# Patient Record
Sex: MALE | Race: BLACK OR AFRICAN AMERICAN | Employment: OTHER | URBAN - METROPOLITAN AREA
[De-identification: names, ages, dates, MRNs, and addresses within clinical notes are randomized per-mention and may not be internally consistent; named-entity substitution may affect disease eponyms.]

---

## 2018-05-10 PROBLEM — N18.6 ESRD ON HEMODIALYSIS (HCC): Status: ACTIVE | Noted: 2017-11-20

## 2018-05-10 PROBLEM — R52 PAIN: Status: ACTIVE | Noted: 2017-10-27

## 2018-05-10 PROBLEM — S82.842A CLOSED BIMALLEOLAR FRACTURE OF LEFT ANKLE: Status: ACTIVE | Noted: 2017-12-26

## 2018-05-10 PROBLEM — R03.0 ELEVATED BLOOD PRESSURE READING: Status: ACTIVE | Noted: 2018-05-10

## 2018-05-10 PROBLEM — R09.02 HYPOXIA: Status: ACTIVE | Noted: 2017-05-13

## 2018-05-10 PROBLEM — S82.402A LEFT FIBULAR FRACTURE: Status: ACTIVE | Noted: 2017-11-20

## 2018-05-10 PROBLEM — E21.3 HYPERPARATHYROIDISM (HCC): Status: ACTIVE | Noted: 2018-04-17

## 2018-05-10 PROBLEM — R07.9 CHEST PAIN: Status: ACTIVE | Noted: 2018-05-10

## 2018-05-10 PROBLEM — N19 UREMIA: Status: ACTIVE | Noted: 2017-08-04

## 2018-05-10 PROBLEM — I21.4 NSTEMI (NON-ST ELEVATED MYOCARDIAL INFARCTION) (HCC): Status: ACTIVE | Noted: 2018-02-04

## 2018-05-10 PROBLEM — M79.672 LEFT FOOT PAIN: Status: ACTIVE | Noted: 2017-12-29

## 2018-05-10 PROBLEM — E87.5 HYPERKALEMIA: Status: ACTIVE | Noted: 2018-02-04

## 2018-05-10 PROBLEM — R76.0 LUPUS ANTICOAGULANT POSITIVE: Status: ACTIVE | Noted: 2018-05-10

## 2018-05-10 PROBLEM — M79.671 RIGHT FOOT PAIN: Status: ACTIVE | Noted: 2017-08-04

## 2018-05-10 PROBLEM — D64.9 ANEMIA: Status: ACTIVE | Noted: 2017-11-20

## 2018-05-10 PROBLEM — D72.829 LEUKOCYTOSIS: Status: ACTIVE | Noted: 2017-11-20

## 2018-05-10 PROBLEM — Z86.711 HX PULMONARY EMBOLISM: Status: ACTIVE | Noted: 2018-05-10

## 2018-05-10 PROBLEM — Z91.199 MEDICALLY NONCOMPLIANT: Status: ACTIVE | Noted: 2017-05-22

## 2018-05-10 PROBLEM — Z99.2 ESRD ON HEMODIALYSIS (HCC): Status: ACTIVE | Noted: 2017-11-20
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
## 2018-05-10 PROBLEM — E87.70 VOLUME OVERLOAD: Status: ACTIVE | Noted: 2017-08-16

## 2018-06-09 PROBLEM — R52 PAIN: Status: RESOLVED | Noted: 2017-10-27 | Resolved: 2018-06-09
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
## 2018-07-06 ENCOUNTER — INITIAL CONSULT (OUTPATIENT)
Dept: SURGERY | Age: 48
End: 2018-07-06

## 2018-07-06 VITALS
DIASTOLIC BLOOD PRESSURE: 106 MMHG | WEIGHT: 151 LBS | BODY MASS INDEX: 22.96 KG/M2 | HEART RATE: 67 BPM | SYSTOLIC BLOOD PRESSURE: 199 MMHG

## 2018-07-06 DIAGNOSIS — K40.90 REDUCIBLE LEFT INGUINAL HERNIA: Primary | ICD-10-CM

## 2018-07-06 PROCEDURE — 99203 OFFICE O/P NEW LOW 30 MIN: CPT | Performed by: SURGERY

## 2018-07-06 PROCEDURE — G8420 CALC BMI NORM PARAMETERS: HCPCS | Performed by: SURGERY

## 2018-07-06 PROCEDURE — G8427 DOCREV CUR MEDS BY ELIG CLIN: HCPCS | Performed by: SURGERY

## 2018-07-06 NOTE — PROGRESS NOTES
mouth daily      pantoprazole (PROTONIX) 40 MG tablet Take 40 mg by mouth daily      milnacipran HCl (SAVELLA) 12.5 MG TABS Take 12.5 mg by mouth 2 times daily      metoprolol succinate (TOPROL XL) 100 MG extended release tablet Take 100 mg by mouth 2 times daily      Cyanocobalamin (VITAMIN B 12 PO) Take 1,000 mcg by mouth daily       levetiracetam (KEPPRA) 500 MG tablet Take 500 mg by mouth 2 times daily       duloxetine (CYMBALTA) 60 MG capsule Take 60 mg by mouth daily.  Melatonin 3 MG TABS Take 3 mg by mouth nightly as needed       carbidopa-levodopa (SINEMET)  MG per tablet Take 3 tablets by mouth 3 times daily.  sevelamer (RENAGEL) 800 MG tablet Take 1,600 mg by mouth 3 times daily (with meals). Past Medical History:   Diagnosis Date    Arthritis     ESRD (end stage renal disease) on dialysis ([facility]     Mon, Wed, Fri [address] [place]    Fibromyalgia     HTN (hypertension)     Neuropathy ([facility]     Restless leg syndrome      Past Surgical History:   Procedure Laterality Date    HERNIA REPAIR       Family History   Problem Relation Age of Onset    Kidney Disease Mother     Hypertension Mother    [facility] Lupus Mother      Social History     Social History    Marital status: Single     Spouse name: N/A    Number of children: N/A    Years of education: N/A     Occupational History    Not on file.      Social History Main Topics    Smoking status: Current Every Day Smoker     Packs/day: 1.00     Types: Cigarettes    Smokeless tobacco: Never Used    Alcohol use 0.5 oz/week     1 Standard drinks or equivalent per week      Comment: drinks for pain relief    Drug use: Yes     Types: Marijuana    Sexual activity: Yes     Partners: Female     Other Topics Concern    Not on file     Social History Narrative    No narrative on file          Vitals:    07/06/18 0951 07/06/18 1001   BP: (!) 199/106 (!) 199/106   Pulse: 67    Weight: 151 lb (68.5 kg)      Body mass index is 22.96 kg/m². Wt Readings from Last 3 Encounters:   07/06/18 151 lb (68.5 kg)   05/11/18 140 lb 14 oz (63.9 kg)   03/13/15 177 lb 14.6 oz (80.7 kg)     BP Readings from Last 3 Encounters:   07/06/18 (!) 199/106   05/11/18 (!) 152/78   03/13/15 149/84          REVIEW OF SYSTEMS:   Pertinent positives are in HPI, otherwise all systems reviewed and negative    PHYSICAL EXAM:    CONSTITUTIONAL:  awake, alert, no apparent distress and normal weight  ENT:  normocephalic, without obvious abnormality  NECK:  supple, symmetrical, trachea midline   LUNGS:  Resp easy and unlabored  CARDIOVASCULAR:  regular rate and rhythm  ABDOMEN:  Infraumbilical and right groin incisions well healed, soft, non-distended, non-tender, voluntary guarding absent,  Examination for hernias: left inguinal hernia present, reducible; No testicular masses. MUSCULOSKELETAL: No edema  NEUROLOGIC:  Mental Status Exam:  Level of Alertness:   awake  Orientation:   person, place, time        ASSESSMENT:     Diagnosis Orders   1. Reducible left inguinal hernia           PLAN:    We will schedule the pt for open left inguinal hernia repair under MAC anesthesia. The technical aspects, risks, benefits and complications of the procedure were discussed with the patient. The pt appears to understand, asks appropriate questions, and agrees to proceed with the procedure. Will have him obtain a preop H&P with Dr. Rosangela Puente his PCP at [facility]. As he has dialysis T-Th-Sa, will plan for surgery this upcoming Wednesday. Will obtain labs on the day of surgery.       Electronically signed by Aleksey Dumont MD on 7/6/2018 at 10:20 AM

## 2018-07-06 NOTE — PATIENT INSTRUCTIONS
Surgeon: Carmelo Ceballos MD     Your surgery will be at [facility] [place]  First floor of the [address] [place] [address]. [address]    Date:  Arrival time:  Surgery time:       You will be on a 15 lbs weight restriction for 6 weeks after surgery. No driving until your off the pain medication. Nothing to eat or drink after midnight the night before. Your time and instructions will be given to you by the surgery scheduler, Dre Mackenzie. She will call you as well as send you a letter with all the details regarding your surgery. Please make a History and Physical (surgery clearance) by your primary care physician prior to your surgery date. * within 30 days of surgery*   Please contact Yenni if you need to reschedule your surgery or have any questions. Our office phone number is [phone].

## 2018-07-06 NOTE — LETTER
Surgery Scheduling Form:      DEMOGRAPHICS:                                                                                                         .    Patient Name:  Gabe Mckay  Patient :  1970   Patient SS#:      Patient Phone:  [phone] (home)  Alt. Patient Phone:                     Patient Address:  [address] [facility] [address]    PCP:  No primary care provider on file. Insurance:  Payor: MEDICARE / Plan: MEDICARE PART A AND B / Product Type: *No Product type* /   Insurance ID Number:    Payor/Plan Subscr  Sex Relation Sub. Ins. ID Effective Group Num   1. MEDICARE - ME* Suzy Fletcher 1970 Male  [id number] 1/1/15                                    [address]        DIAGNOSIS & PROCEDURE:                                                                                       .    Diagnosis:   K40.90 - Reducible left inguinal hernia      Operation:  Open Left Inguinal Hernia Repair with Mesh   Location: [facility]   Surgeon:  Fanny Ruiz MD          [facility] INFORMATION:                                                                                    .    Surgeon's Scheduling Instruction:  elective  Requested Date: 18   OR Time:           Patient Arrival Time:   OR Time Required:  90  Minutes  Anesthesia:  MAC/TIVA  Equipment:                                                            SA Required:  Yes     Status:  Outpatient          Standard C-Arm:  No   PAT Required:   Yes                             Best Time to Call:  ANY   Patient Requested to see PCP for Pre-op H & P:  Yes   Special Comments:                            Fanny Ruiz MD  55   PRE-CERTIFICATION INFORMATION:                                                                           .    Procedure:       CPT Code Modifier         68587

## 2018-07-10 ENCOUNTER — TELEPHONE (OUTPATIENT)
Dept: SURGERY | Age: 48
End: 2018-07-10

## 2018-07-11 ENCOUNTER — HOSPITAL ENCOUNTER (OUTPATIENT)
Dept: SURGERY | Age: 48
Discharge: HOME | End: 2018-07-11
Attending: SURGERY | Admitting: SURGERY

## 2018-07-11 VITALS
HEART RATE: 69 BPM | BODY MASS INDEX: 22.34 KG/M2 | SYSTOLIC BLOOD PRESSURE: 189 MMHG | TEMPERATURE: 97 F | WEIGHT: 147.38 LBS | DIASTOLIC BLOOD PRESSURE: 100 MMHG | HEIGHT: 68 IN | RESPIRATION RATE: 16 BRPM | OXYGEN SATURATION: 97 %

## 2018-07-11 DIAGNOSIS — K40.90 REDUCIBLE LEFT INGUINAL HERNIA: Primary | ICD-10-CM

## 2018-07-11 LAB
ANION GAP SERPL CALCULATED.3IONS-SCNC: 15 MMOL/L (ref 3–16)
BUN BLDV-MCNC: 30 MG/DL (ref 7–20)
CALCIUM SERPL-MCNC: 8.1 MG/DL (ref 8.3–10.6)
CHLORIDE BLD-SCNC: 101 MMOL/L (ref 99–110)
CO2: 27 MMOL/L (ref 21–32)
CREAT SERPL-MCNC: 9.3 MG/DL (ref 0.9–1.3)
GFR AFRICAN AMERICAN: 7
GFR NON-AFRICAN AMERICAN: 6
GLUCOSE BLD-MCNC: 84 MG/DL (ref 70–99)
POTASSIUM REFLEX MAGNESIUM: 4.9 MMOL/L (ref 3.5–5.1)
SODIUM BLD-SCNC: 143 MMOL/L (ref 136–145)

## 2018-07-11 PROCEDURE — 49505 PRP I/HERN INIT REDUC >5 YR: CPT | Performed by: SURGERY

## 2018-07-11 RX ORDER — SODIUM CHLORIDE 0.9 % (FLUSH) 0.9 %
10 SYRINGE (ML) INJECTION EVERY 12 HOURS SCHEDULED
Status: DISCONTINUED | OUTPATIENT
Start: 2018-07-11 | End: 2018-07-11 | Stop reason: SDUPTHER

## 2018-07-11 RX ORDER — FENTANYL CITRATE 50 UG/ML
25 INJECTION, SOLUTION INTRAMUSCULAR; INTRAVENOUS EVERY 5 MIN PRN
Status: DISCONTINUED | OUTPATIENT
Start: 2018-07-11 | End: 2018-07-12 | Stop reason: HOSPADM

## 2018-07-11 RX ORDER — LABETALOL HYDROCHLORIDE 5 MG/ML
INJECTION, SOLUTION INTRAVENOUS
Status: COMPLETED
Start: 2018-07-11 | End: 2018-07-11

## 2018-07-11 RX ORDER — LABETALOL HYDROCHLORIDE 5 MG/ML
10 INJECTION, SOLUTION INTRAVENOUS ONCE
Status: COMPLETED | OUTPATIENT
Start: 2018-07-11 | End: 2018-07-11

## 2018-07-11 RX ORDER — LEVETIRACETAM 500 MG/1
500 TABLET ORAL ONCE
Status: COMPLETED | OUTPATIENT
Start: 2018-07-11 | End: 2018-07-11

## 2018-07-11 RX ORDER — SODIUM CHLORIDE 0.9 % (FLUSH) 0.9 %
10 SYRINGE (ML) INJECTION PRN
Status: DISCONTINUED | OUTPATIENT
Start: 2018-07-11 | End: 2018-07-12 | Stop reason: HOSPADM

## 2018-07-11 RX ORDER — OXYCODONE HYDROCHLORIDE AND ACETAMINOPHEN 5; 325 MG/1; MG/1
1-2 TABLET ORAL EVERY 6 HOURS PRN
Qty: 28 TABLET | Refills: 0 | Status: SHIPPED | OUTPATIENT
Start: 2018-07-11 | End: 2018-07-18

## 2018-07-11 RX ORDER — OXYCODONE HYDROCHLORIDE AND ACETAMINOPHEN 5; 325 MG/1; MG/1
1 TABLET ORAL
Status: COMPLETED | OUTPATIENT
Start: 2018-07-11 | End: 2018-07-11

## 2018-07-11 RX ORDER — SODIUM CHLORIDE 9 MG/ML
INJECTION, SOLUTION INTRAVENOUS CONTINUOUS
Status: DISCONTINUED | OUTPATIENT
Start: 2018-07-11 | End: 2018-07-12 | Stop reason: HOSPADM

## 2018-07-11 RX ORDER — FENTANYL CITRATE 50 UG/ML
50 INJECTION, SOLUTION INTRAMUSCULAR; INTRAVENOUS EVERY 5 MIN PRN
Status: DISCONTINUED | OUTPATIENT
Start: 2018-07-11 | End: 2018-07-12 | Stop reason: HOSPADM

## 2018-07-11 RX ORDER — SODIUM CHLORIDE 0.9 % (FLUSH) 0.9 %
10 SYRINGE (ML) INJECTION EVERY 12 HOURS SCHEDULED
Status: DISCONTINUED | OUTPATIENT
Start: 2018-07-11 | End: 2018-07-12 | Stop reason: HOSPADM

## 2018-07-11 RX ORDER — LOSARTAN POTASSIUM 50 MG/1
100 TABLET ORAL ONCE
Status: COMPLETED | OUTPATIENT
Start: 2018-07-11 | End: 2018-07-11

## 2018-07-11 RX ORDER — SODIUM CHLORIDE 0.9 % (FLUSH) 0.9 %
10 SYRINGE (ML) INJECTION PRN
Status: DISCONTINUED | OUTPATIENT
Start: 2018-07-11 | End: 2018-07-11 | Stop reason: SDUPTHER

## 2018-07-11 RX ORDER — NIFEDIPINE 90 MG/1
90 TABLET, EXTENDED RELEASE ORAL ONCE
Status: COMPLETED | OUTPATIENT
Start: 2018-07-11 | End: 2018-07-11

## 2018-07-11 RX ORDER — HYDRALAZINE HYDROCHLORIDE 20 MG/ML
INJECTION INTRAMUSCULAR; INTRAVENOUS
Status: DISPENSED
Start: 2018-07-11 | End: 2018-07-11

## 2018-07-11 RX ORDER — HYDRALAZINE HYDROCHLORIDE 20 MG/ML
10 INJECTION INTRAMUSCULAR; INTRAVENOUS ONCE
Status: COMPLETED | OUTPATIENT
Start: 2018-07-11 | End: 2018-07-11

## 2018-07-11 RX ORDER — MEPERIDINE HYDROCHLORIDE 25 MG/ML
12.5 INJECTION INTRAMUSCULAR; INTRAVENOUS; SUBCUTANEOUS EVERY 5 MIN PRN
Status: DISCONTINUED | OUTPATIENT
Start: 2018-07-11 | End: 2018-07-12 | Stop reason: HOSPADM

## 2018-07-11 RX ORDER — DOCUSATE SODIUM 100 MG/1
100 CAPSULE, LIQUID FILLED ORAL 2 TIMES DAILY PRN
Status: ON HOLD | COMMUNITY
Start: 2018-07-11 | End: 2019-12-27 | Stop reason: ALTCHOICE

## 2018-07-11 RX ADMIN — LABETALOL HYDROCHLORIDE 10 MG: 5 INJECTION, SOLUTION INTRAVENOUS at 10:06

## 2018-07-11 RX ADMIN — Medication 50 MG: at 08:09

## 2018-07-11 RX ADMIN — LABETALOL HYDROCHLORIDE 10 MG: 5 INJECTION, SOLUTION INTRAVENOUS at 10:51

## 2018-07-11 RX ADMIN — HYDRALAZINE HYDROCHLORIDE 10 MG: 20 INJECTION INTRAMUSCULAR; INTRAVENOUS at 11:38

## 2018-07-11 RX ADMIN — HYDRALAZINE HYDROCHLORIDE 10 MG: 20 INJECTION INTRAMUSCULAR; INTRAVENOUS at 10:51

## 2018-07-11 RX ADMIN — SODIUM CHLORIDE: 9 INJECTION, SOLUTION INTRAVENOUS at 08:07

## 2018-07-11 RX ADMIN — LEVETIRACETAM 500 MG: 500 TABLET ORAL at 08:09

## 2018-07-11 RX ADMIN — OXYCODONE HYDROCHLORIDE AND ACETAMINOPHEN 1 TABLET: 5; 325 TABLET ORAL at 12:48

## 2018-07-11 RX ADMIN — LOSARTAN POTASSIUM 100 MG: 50 TABLET ORAL at 08:10

## 2018-07-11 RX ADMIN — LABETALOL HYDROCHLORIDE 10 MG: 5 INJECTION, SOLUTION INTRAVENOUS at 11:38

## 2018-07-11 RX ADMIN — NIFEDIPINE 90 MG: 90 TABLET, EXTENDED RELEASE ORAL at 08:09

## 2018-07-11 ASSESSMENT — PAIN SCALES - GENERAL
PAINLEVEL_OUTOF10: 0
PAINLEVEL_OUTOF10: 10
PAINLEVEL_OUTOF10: 0
PAINLEVEL_OUTOF10: 0

## 2018-07-11 ASSESSMENT — PAIN - FUNCTIONAL ASSESSMENT: PAIN_FUNCTIONAL_ASSESSMENT: 0-10

## 2018-07-11 ASSESSMENT — LIFESTYLE VARIABLES: SMOKING_STATUS: 1

## 2018-07-11 ASSESSMENT — ENCOUNTER SYMPTOMS: SHORTNESS OF BREATH: 1

## 2018-07-11 NOTE — ANESTHESIA PRE-OP
[facility] Department of Anesthesiology  Pre-Anesthesia Evaluation/Consultation       Name:  Meredith Ahumada  : 1970  Age:  50 y.o. MRN:  [id number]  Date: 2018           Procedure (Scheduled):  Open left inguinal hernia repair with mesh  Surgeon:  Dr. Armani Coats     Allergies   Allergen Reactions    Amlodipine Besylate-Valsartan Anaphylaxis    Losartan Swelling     Throat swelling  angioedema    Zolpidem Anaphylaxis     Other reaction(s):  Other (See Comments)  Triggers seizures    Diovan [Valsartan] Swelling     Other reaction(s): Unknown  Patient does not remember, possible angioedema    Lisinopril Rash     Other reaction(s): Unknown  Patient does not remember, possible angioedema  Angioedema    Lyrica [Pregabalin] Other (See Comments)     Other reaction(s): Unknown  Patient does not remember    Morphine Itching    Naproxen Swelling     Other reaction(s): Unknown  Patient does not remember    Ondansetron Hcl Itching    Povidone-Iodine Rash     itching     Patient Active Problem List   Diagnosis    HTN (hypertension), malignant    ESRD (end stage renal disease) on dialysis ([address]    Seizure disorder (HCC)    Restless legs syndrome (RLS)    Chronic pancreatitis (HCC)    Anemia    Anemia of chronic disease    Antiphospholipid antibody syndrome (HCC)    Atypical chest pain    C. difficile diarrhea    CH[facility]congestive heart failure) ([facility]    Chronic pain disorder    Closed bimalleolar fracture of left ankle    Elevated blood pressure reading    ESRD on hemodialysis ([address]    End-stage renal disease on hemodialysis ([address]    Essential hypertension    Lupus anticoagulant positive    Hx pulmonary embolism    History of pulmonary embolism    HTN (hypertension)    Hyperkalemia    Hyperparathyroidism ([address]    Hypoxia    Insomnia    Left fibular fracture    Left foot pain    Leukocytosis    Medically noncompliant    NSTEMI (non-ST tablet Take 40 mg by mouth daily      metoprolol succinate (TOPROL XL) 100 MG extended release tablet Take 100 mg by mouth 2 times daily      Cyanocobalamin (VITAMIN B 12 PO) Take 1,000 mcg by mouth daily       levetiracetam (KEPPRA) 500 MG tablet Take 500 mg by mouth 2 times daily       duloxetine (CYMBALTA) 60 MG capsule Take 60 mg by mouth daily.  Melatonin 3 MG TABS Take 3 mg by mouth nightly as needed       carbidopa-levodopa (SINEMET)  MG per tablet Take 3 tablets by mouth 3 times daily. No current facility-administered medications on file prior to encounter. Current Outpatient Prescriptions   Medication Sig Dispense Refill    albuterol sulfate  (90 Base) MCG/ACT inhaler Inhale 2 puffs into the lungs      promethazine (PHENERGAN) 25 MG tablet Take 25 mg by mouth      promethazine (PHENERGAN) 25 MG tablet Take 25 mg by mouth      aspirin 81 MG chewable tablet Take 1 tablet by mouth daily 30 tablet 0    calcium carbonate (TUMS) 500 MG chewable tablet Take 3 tablets by mouth 3 times daily       losartan (COZAAR) 100 MG tablet Take 100 mg by mouth daily      NIFEdipine (PROCARDIA XL) 60 MG extended release tablet Take 60 mg by mouth daily      calcitRIOL (ROCALTROL) 0.5 MCG capsule Take 0.5 mcg by mouth 2 times daily      gabapentin (NEURONTIN) 100 MG capsule Take 100 mg by mouth 3 times daily. Orval Opitz isosorbide mononitrate (IMDUR) 30 MG extended release tablet Take 30 mg by mouth daily      atorvastatin (LIPITOR) 40 MG tablet Take 40 mg by mouth daily      pantoprazole (PROTONIX) 40 MG tablet Take 40 mg by mouth daily      metoprolol succinate (TOPROL XL) 100 MG extended release tablet Take 100 mg by mouth 2 times daily      Cyanocobalamin (VITAMIN B 12 PO) Take 1,000 mcg by mouth daily       levetiracetam (KEPPRA) 500 MG tablet Take 500 mg by mouth 2 times daily       duloxetine (CYMBALTA) 60 MG capsule Take 60 mg by mouth daily.         Melatonin 3 MG TABS

## 2018-07-11 NOTE — PROGRESS NOTES
Call placed to Dr. Flavia Kirby for new orders pain not relieved. Advised to call Dr. Jordyn López. Pt family member walked into the farrell and asked for someone to remove his IV so he could leave. This nurse went into the room to explain I was trying to get ahold of the Dr. Shea Jaeger demanded I take the IV out so he could leave. This nurse d/c'd IV. No complications noted. Pt began to walk out of the room refusing to use a wheelchair. This nurse insisted pt use a wheelchair. Assisted to car by volunteer Theodora Bryan.

## 2018-07-11 NOTE — PROGRESS NOTES
Patient arrived in pre op for left inguinal hernia repair; drowsy, poor historian. States he has been out of BP and seizure  meds for about one month; Dr. Bárbara Oakley and Dr. Nabor Amezcua made aware. Medications ordered and given. Patient states he takes Losartan, despite it being listed as an allergy. States tolerates Losartan fine. PCP does list as allergy, but also listed as current medication. Dr. Nabor Amezcua aware of BMP results.

## 2018-07-12 ENCOUNTER — TELEPHONE (OUTPATIENT)
Dept: SURGERY | Age: 48
End: 2018-07-12

## 2019-12-26 ENCOUNTER — HOSPITAL ENCOUNTER (INPATIENT)
Age: 49
LOS: 2 days | Discharge: HOME | DRG: 640 | End: 2019-12-28
Attending: EMERGENCY MEDICINE | Admitting: PEDIATRICS
Payer: MEDICARE

## 2019-12-26 ENCOUNTER — APPOINTMENT (OUTPATIENT)
Dept: CT IMAGING | Age: 49
DRG: 640 | End: 2019-12-26
Payer: MEDICARE

## 2019-12-26 ENCOUNTER — APPOINTMENT (OUTPATIENT)
Dept: GENERAL RADIOLOGY | Age: 49
DRG: 640 | End: 2019-12-26
Payer: MEDICARE

## 2019-12-26 LAB
A/G RATIO: 1.3 (ref 1.1–2.2)
ALBUMIN SERPL-MCNC: 4.5 G/DL (ref 3.4–5)
ALP BLD-CCNC: 184 U/L (ref 40–129)
ALT SERPL-CCNC: <5 U/L (ref 10–40)
ANION GAP SERPL CALCULATED.3IONS-SCNC: 28 MMOL/L (ref 3–16)
AST SERPL-CCNC: 18 U/L (ref 15–37)
BASOPHILS ABSOLUTE: 0.1 K/UL (ref 0–0.2)
BASOPHILS RELATIVE PERCENT: 1.2 %
BILIRUB SERPL-MCNC: 0.3 MG/DL (ref 0–1)
BUN BLDV-MCNC: 104 MG/DL (ref 7–20)
CALCIUM SERPL-MCNC: 9.4 MG/DL (ref 8.3–10.6)
CHLORIDE BLD-SCNC: 97 MMOL/L (ref 99–110)
CO2: 16 MMOL/L (ref 21–32)
CREAT SERPL-MCNC: 29.1 MG/DL (ref 0.9–1.3)
EOSINOPHILS ABSOLUTE: 0.2 K/UL (ref 0–0.6)
EOSINOPHILS RELATIVE PERCENT: 2.1 %
GFR AFRICAN AMERICAN: 2
GFR NON-AFRICAN AMERICAN: 2
GLOBULIN: 3.6 G/DL
GLUCOSE BLD-MCNC: 110 MG/DL (ref 70–99)
HCT VFR BLD CALC: 32.6 % (ref 40.5–52.5)
HEMOGLOBIN: 10.7 G/DL (ref 13.5–17.5)
LACTIC ACID: 1.1 MMOL/L (ref 0.4–2)
LIPASE: 201 U/L (ref 13–60)
LYMPHOCYTES ABSOLUTE: 1.1 K/UL (ref 1–5.1)
LYMPHOCYTES RELATIVE PERCENT: 12.1 %
MAGNESIUM: 2.7 MG/DL (ref 1.8–2.4)
MCH RBC QN AUTO: 32.1 PG (ref 26–34)
MCHC RBC AUTO-ENTMCNC: 32.9 G/DL (ref 31–36)
MCV RBC AUTO: 97.7 FL (ref 80–100)
MONOCYTES ABSOLUTE: 0.5 K/UL (ref 0–1.3)
MONOCYTES RELATIVE PERCENT: 5.3 %
NEUTROPHILS ABSOLUTE: 7.2 K/UL (ref 1.7–7.7)
NEUTROPHILS RELATIVE PERCENT: 79.3 %
PDW BLD-RTO: 17.8 % (ref 12.4–15.4)
PHOSPHORUS: 3 MG/DL (ref 2.5–4.9)
PLATELET # BLD: 157 K/UL (ref 135–450)
PMV BLD AUTO: 10.1 FL (ref 5–10.5)
POTASSIUM SERPL-SCNC: 6.1 MMOL/L (ref 3.5–5.1)
PRO-BNP: 8203 PG/ML (ref 0–124)
RBC # BLD: 3.34 M/UL (ref 4.2–5.9)
SODIUM BLD-SCNC: 141 MMOL/L (ref 136–145)
TOTAL PROTEIN: 8.1 G/DL (ref 6.4–8.2)
TROPONIN: 0.06 NG/ML
WBC # BLD: 9 K/UL (ref 4–11)

## 2019-12-26 PROCEDURE — 96374 THER/PROPH/DIAG INJ IV PUSH: CPT

## 2019-12-26 PROCEDURE — 80053 COMPREHEN METABOLIC PANEL: CPT

## 2019-12-26 PROCEDURE — 84484 ASSAY OF TROPONIN QUANT: CPT

## 2019-12-26 PROCEDURE — 85025 COMPLETE CBC W/AUTO DIFF WBC: CPT

## 2019-12-26 PROCEDURE — 1200000000 HC SEMI PRIVATE

## 2019-12-26 PROCEDURE — 6370000000 HC RX 637 (ALT 250 FOR IP): Performed by: PHYSICIAN ASSISTANT

## 2019-12-26 PROCEDURE — 83880 ASSAY OF NATRIURETIC PEPTIDE: CPT

## 2019-12-26 PROCEDURE — 93005 ELECTROCARDIOGRAM TRACING: CPT | Performed by: PHYSICIAN ASSISTANT

## 2019-12-26 PROCEDURE — 83735 ASSAY OF MAGNESIUM: CPT

## 2019-12-26 PROCEDURE — 99285 EMERGENCY DEPT VISIT HI MDM: CPT

## 2019-12-26 PROCEDURE — 71046 X-RAY EXAM CHEST 2 VIEWS: CPT

## 2019-12-26 PROCEDURE — 83605 ASSAY OF LACTIC ACID: CPT

## 2019-12-26 PROCEDURE — 2500000003 HC RX 250 WO HCPCS: Performed by: PHYSICIAN ASSISTANT

## 2019-12-26 PROCEDURE — 36415 COLL VENOUS BLD VENIPUNCTURE: CPT

## 2019-12-26 PROCEDURE — 74176 CT ABD & PELVIS W/O CONTRAST: CPT

## 2019-12-26 PROCEDURE — 84100 ASSAY OF PHOSPHORUS: CPT

## 2019-12-26 PROCEDURE — 83690 ASSAY OF LIPASE: CPT

## 2019-12-26 RX ORDER — DEXTROSE MONOHYDRATE 25 G/50ML
12.5 INJECTION, SOLUTION INTRAVENOUS PRN
Status: CANCELLED | OUTPATIENT
Start: 2019-12-26

## 2019-12-26 RX ORDER — NICOTINE POLACRILEX 4 MG
15 LOZENGE BUCCAL PRN
Status: CANCELLED | OUTPATIENT
Start: 2019-12-26

## 2019-12-26 RX ORDER — SODIUM POLYSTYRENE SULFONATE 15 G/60ML
10 SUSPENSION ORAL; RECTAL ONCE
Status: COMPLETED | OUTPATIENT
Start: 2019-12-26 | End: 2019-12-26

## 2019-12-26 RX ORDER — DEXTROSE MONOHYDRATE 25 G/50ML
25 INJECTION, SOLUTION INTRAVENOUS ONCE
Status: CANCELLED | OUTPATIENT
Start: 2019-12-26 | End: 2019-12-26

## 2019-12-26 RX ORDER — LABETALOL 300 MG/1
300 TABLET, FILM COATED ORAL 2 TIMES DAILY
Status: ON HOLD | COMMUNITY
End: 2019-12-27 | Stop reason: ALTCHOICE

## 2019-12-26 RX ADMIN — SODIUM BICARBONATE 50 MEQ: 84 INJECTION, SOLUTION INTRAVENOUS at 21:31

## 2019-12-26 RX ADMIN — Medication 10 G: at 21:31

## 2019-12-26 ASSESSMENT — PAIN DESCRIPTION - DESCRIPTORS: DESCRIPTORS: ACHING

## 2019-12-26 ASSESSMENT — ENCOUNTER SYMPTOMS
APNEA: 0
NAUSEA: 0
DIARRHEA: 1
BACK PAIN: 0
VOMITING: 0
SHORTNESS OF BREATH: 1
CHOKING: 0
EYE DISCHARGE: 0
ABDOMINAL PAIN: 0
EYE REDNESS: 0
FACIAL SWELLING: 0

## 2019-12-26 ASSESSMENT — PAIN SCALES - WONG BAKER: WONGBAKER_NUMERICALRESPONSE: 0

## 2019-12-26 ASSESSMENT — PAIN DESCRIPTION - LOCATION: LOCATION: BACK

## 2019-12-26 ASSESSMENT — PAIN DESCRIPTION - ONSET: ONSET: PROGRESSIVE

## 2019-12-26 ASSESSMENT — PAIN SCALES - GENERAL: PAINLEVEL_OUTOF10: 4

## 2019-12-26 ASSESSMENT — PAIN DESCRIPTION - PROGRESSION: CLINICAL_PROGRESSION: GRADUALLY WORSENING

## 2019-12-26 NOTE — ED PROVIDER NOTES
Positive for diarrhea. Negative for abdominal pain, nausea and vomiting. Genitourinary: Negative for dysuria. Musculoskeletal: Negative for back pain, neck pain and neck stiffness. Neurological: Negative for dizziness, tremors, seizures and headaches. All other systems reviewed and are negative. Positives and Pertinent negatives as per HPI. Except as noted above in the ROS, all other systems were reviewed and negative. PAST MEDICAL HISTORY     Past Medical History:   Diagnosis Date    Arthritis     ESRD (end stage renal disease) on [facility] ([facility]     Mon, Wed, Fri [address] Deborah Roberson     Hemodialysis patient ([facility]     HTN (hypertension)     Neuropathy     Restless leg     Restless leg syndrome     Seizure ([facility]     last sz 2016         SURGICAL HISTORY     Past Surgical History:   Procedure Laterality Date    AXILLARY-BRACHIAL BYPASS GRAFT Left     HERNIA REPAIR      PARATHYROIDECTOMY           CURRENTMEDICATIONS       Current Discharge Medication List      CONTINUE these medications which have NOT CHANGED    Details   labetalol (NORMODYNE) 300 MG tablet Take 300 mg by mouth 2 times daily      Multiple Vitamin (DAILY EZEKIEL PO) Take by mouth      albuterol sulfate  (90 Base) MCG/ACT inhaler Inhale 2 puffs into the lungs      ! ! promethazine (PHENERGAN) 25 MG tablet Take 25 mg by mouth      aspirin 81 MG chewable tablet Take 1 tablet by mouth daily  Qty: 30 tablet, Refills: 0      losartan (COZAAR) 100 MG tablet Take 100 mg by mouth daily      gabapentin (NEURONTIN) 100 MG capsule Take 300 mg by mouth 3 times daily.        atorvastatin (LIPITOR) 40 MG tablet Take 80 mg by mouth daily       metoprolol succinate (TOPROL XL) 100 MG extended release tablet Take 25 mg by mouth daily       Cyanocobalamin (VITAMIN B 12 PO) Take 1,000 mcg by mouth daily       levetiracetam (KEPPRA) 500 MG tablet Take 500 mg by mouth daily       duloxetine (CYMBALTA) 60 MG Narrative:     Performed at:  [facility]  [address] [place], [place]   Phone [phone]   COMPREHENSIVE METABOLIC PANEL - Abnormal; Notable for the following components:    Potassium 6.1 (*)     Chloride 97 (*)     CO2 16 (*)     Anion Gap 28 (*)     Glucose 110 (*)      (*)     CREATININE 29.1 (*)     GFR Non- 2 (*)     GFR African American 2 (*)     Alkaline Phosphatase 184 (*)     ALT <5 (*)     All other components within normal limits    Narrative:     GHASSAN Rowan  Lm Specter [id number],  Chemistry results called to and read back by jose payne, 12/26/2019  19:12, by [facility]  Performed at:  [facility]  [address] [place], [place]   Phone [phone]   LIPASE - Abnormal; Notable for the following components:    Lipase 201.0 (*)     All other components within normal limits    Narrative:     Jocelyne Amato Specter [id number],  Chemistry results called to and read back by jose payne, 12/26/2019  19:12, by [facility]  Performed at:  [facility]  [address] [place], [place]   Phone [phone] - Abnormal; Notable for the following components:    Pro-BNP 8,203 (*)     All other components within normal limits    Narrative:     Jocelyne Blaser [id number],  Chemistry results called to and read back by jose payne, 12/26/2019  19:12, by [facility]  Performed at:  [facility]) [facility]  [address] [place], [place]   Phone [phone]   MAGNESIUM - Abnormal; Notable for the following components:    Magnesium 2.70 (*)     All other components within normal limits    Narrative:     Jocelyne JEFFERSON tel. [phone],  Chemistry results called to and read back by jose payne, 12/26/2019  19:12, by [facility]  Performed at:  [facility]  [address] [place], [place]   Phone [phone]   TROPONIN - Abnormal; Notable for the following components:    Troponin 0.06 (*)     All other components within normal limits    Narrative:     Jennette Fabry [facility] [phone],  Chemistry results called to and read back by jose payne, 12/26/2019  19:12, by [facility]  Performed at:  [facility]) [facility]  [address] [place], [place]   Phone [phone]   LACTIC ACID, PLASMA    Narrative:     Performed at:  [facility]  [address] [place], [place]   Phone [phone]   PHOSPHORUS    Narrative:     [facility] tel. [phone],  Chemistry results called to and read back by jose Leon, 12/26/2019  19:12, by [facility]  Performed at:  [facility]  [address] [place], [place]   Phone [phone]   URINE RT REFLEX TO CULTURE       All other labs were within normal range or not returned as of this dictation. EKG: All EKG's are interpreted by the Emergency Department Physician in the absence of a cardiologist.  Please see their note for interpretation of EKG. RADIOLOGY:   Non-plain film images such as CT, Ultrasound and MRI are read by the radiologist. Plain radiographic images are visualized and preliminarily interpreted by the  ED Provider with the below findings:        Interpretation per the Radiologist below, if available at the time of this note:    CT ABDOMEN PELVIS WO CONTRAST Additional Contrast? None   Final Result   1. Nonspecific diarrheal disease   2. Polycystic kidney disease with nonobstructing nephrolithiasis   3. Colonic diverticulosis         XR CHEST STANDARD (2 VW)   Final Result   Interstitial pulmonary edema.   There is a possible sub pulmonic right pleural   effusion although elevation of the right diaphragm is favored No results found. PROCEDURES   Unless otherwise noted below, none     Procedures    CRITICAL CARE TIME   N/A    CONSULTS:  IP CONSULT TO NEPHROLOGY  IP CONSULT TO NEPHROLOGY  IP CONSULT TO HOSPITALIST      EMERGENCY DEPARTMENT COURSE and DIFFERENTIAL DIAGNOSIS/MDM:   Vitals:    Vitals:    12/26/19 2315 12/26/19 2330 12/27/19 0000 12/27/19 0103   BP: (!) 161/90 (!) 146/105 (!) 182/99 (!) 196/97   Pulse: 111 109 95 85   Resp: 19 25 19 16   Temp: 98.4 °F (36.9 °C)   98.3 °F (36.8 °C)   TempSrc: Oral   Oral   SpO2: 100%   100%   Weight:       Height:           Patient was given thefollowing medications:  Medications   sodium polystyrene (KAYEXALATE) 15 GM/60ML suspension 10 g (10 g Oral Given 12/26/19 2131)   sodium bicarbonate 8.4 % injection 50 mEq (50 mEq Intravenous Given 12/26/19 2131)       Emergency room course: Patient on exam pupils equal round and reactive to light extraocular movements intact. Throat is clear nonerythematous no exudate. Neck is supple full range of motion without tenderness. No midline tenderness or with thoracic or lumbar spine. Cardiovascular regular rate rhythm, lungs show faint expiratory wheeze no rales or rhonchi noted. Chest wall show no tenderness. Abdomen is soft somewhat distended. Nontender with palpation. Normal bowel sounds all 4 quadrant. Bilateral lower extremity shows 2+ pitting edema. Patient full range of motion lower extremity. Neurologically no other motor or sensory deficit noted. He is alert and oriented x4. Does not appear to be in acute distress. Besides blood pressure being elevated he has normal respiration and pulse rate with O2 sat at 97% on room air. Lab results from today shows CBC with a white count of 9.0, RBC 3.34, hemoglobin 10.7 hematocrit 32.6. CMP shows sodium of 141, potassium 6.1, chloride 97 with a  and creatinine 29.1. CO2 of 16. Normal transaminases. Lipase 201.0    Lactic acid 1.1    BNP 8203.     Magnesium note were completed with a voice recognition program.  Efforts were made to edit the dictations but occasionally words are mis-transcribed.)    Stella Garcia PA-C (electronically signed)           Stella Garcia PA-C  12/27/19 0111

## 2019-12-26 NOTE — ED NOTES
Pt was at [facility] ER this am had labs drawn  Demetrice Bars for pain medication for chronic back pain  Pt states has had episodes of diarrhea  Past 3 days no vomiting  C/o increase SOB today with swelling to abd     Melania Rodriguez RN  12/26/19 8913

## 2019-12-27 ENCOUNTER — APPOINTMENT (OUTPATIENT)
Dept: GENERAL RADIOLOGY | Age: 49
DRG: 640 | End: 2019-12-27
Payer: MEDICARE

## 2019-12-27 LAB
ALBUMIN SERPL-MCNC: 3.7 G/DL (ref 3.4–5)
ANION GAP SERPL CALCULATED.3IONS-SCNC: 35 MMOL/L (ref 3–16)
BUN BLDV-MCNC: 96 MG/DL (ref 7–20)
C-REACTIVE PROTEIN: 7.4 MG/L (ref 0–5.1)
CALCIUM SERPL-MCNC: 8.5 MG/DL (ref 8.3–10.6)
CHLORIDE BLD-SCNC: 95 MMOL/L (ref 99–110)
CO2: 8 MMOL/L (ref 21–32)
CREAT SERPL-MCNC: 30.8 MG/DL (ref 0.9–1.3)
EKG ATRIAL RATE: 80 BPM
EKG DIAGNOSIS: NORMAL
EKG P AXIS: 56 DEGREES
EKG P-R INTERVAL: 194 MS
EKG Q-T INTERVAL: 390 MS
EKG QRS DURATION: 104 MS
EKG QTC CALCULATION (BAZETT): 520 MS
EKG R AXIS: 50 DEGREES
EKG T AXIS: 26 DEGREES
EKG VENTRICULAR RATE: 107 BPM
GFR AFRICAN AMERICAN: 2
GFR NON-AFRICAN AMERICAN: 2
GLUCOSE BLD-MCNC: 103 MG/DL (ref 70–99)
HBV SURFACE AB TITR SER: 127.9 MIU/ML
HEPATITIS B SURFACE ANTIGEN INTERPRETATION: NORMAL
PHOSPHORUS: 3.7 MG/DL (ref 2.5–4.9)
POTASSIUM SERPL-SCNC: 6.7 MMOL/L (ref 3.5–5.1)
RAPID INFLUENZA  B AGN: NEGATIVE
RAPID INFLUENZA A AGN: NEGATIVE
SEDIMENTATION RATE, ERYTHROCYTE: 55 MM/HR (ref 0–15)
SODIUM BLD-SCNC: 138 MMOL/L (ref 136–145)
URIC ACID, SERUM: 11 MG/DL (ref 3.5–7.2)

## 2019-12-27 PROCEDURE — 1200000000 HC SEMI PRIVATE

## 2019-12-27 PROCEDURE — 2580000003 HC RX 258: Performed by: PEDIATRICS

## 2019-12-27 PROCEDURE — 86140 C-REACTIVE PROTEIN: CPT

## 2019-12-27 PROCEDURE — 6370000000 HC RX 637 (ALT 250 FOR IP): Performed by: PEDIATRICS

## 2019-12-27 PROCEDURE — 6370000000 HC RX 637 (ALT 250 FOR IP): Performed by: NURSE PRACTITIONER

## 2019-12-27 PROCEDURE — 90935 HEMODIALYSIS ONE EVALUATION: CPT

## 2019-12-27 PROCEDURE — 86706 HEP B SURFACE ANTIBODY: CPT

## 2019-12-27 PROCEDURE — 94760 N-INVAS EAR/PLS OXIMETRY 1: CPT

## 2019-12-27 PROCEDURE — 36415 COLL VENOUS BLD VENIPUNCTURE: CPT

## 2019-12-27 PROCEDURE — 80069 RENAL FUNCTION PANEL: CPT

## 2019-12-27 PROCEDURE — 87340 HEPATITIS B SURFACE AG IA: CPT

## 2019-12-27 PROCEDURE — 73560 X-RAY EXAM OF KNEE 1 OR 2: CPT

## 2019-12-27 PROCEDURE — 93010 ELECTROCARDIOGRAM REPORT: CPT | Performed by: INTERNAL MEDICINE

## 2019-12-27 PROCEDURE — 86704 HEP B CORE ANTIBODY TOTAL: CPT

## 2019-12-27 PROCEDURE — 5A1D70Z PERFORMANCE OF URINARY FILTRATION, INTERMITTENT, LESS THAN 6 HOURS PER DAY: ICD-10-PCS | Performed by: INTERNAL MEDICINE

## 2019-12-27 PROCEDURE — 73630 X-RAY EXAM OF FOOT: CPT

## 2019-12-27 PROCEDURE — 85652 RBC SED RATE AUTOMATED: CPT

## 2019-12-27 PROCEDURE — 84550 ASSAY OF BLOOD/URIC ACID: CPT

## 2019-12-27 PROCEDURE — 87804 INFLUENZA ASSAY W/OPTIC: CPT

## 2019-12-27 RX ORDER — PANTOPRAZOLE SODIUM 40 MG/1
40 TABLET, DELAYED RELEASE ORAL DAILY
Status: DISCONTINUED | OUTPATIENT
Start: 2019-12-27 | End: 2019-12-28 | Stop reason: HOSPADM

## 2019-12-27 RX ORDER — DULOXETIN HYDROCHLORIDE 60 MG/1
60 CAPSULE, DELAYED RELEASE ORAL DAILY
Status: DISCONTINUED | OUTPATIENT
Start: 2019-12-27 | End: 2019-12-28 | Stop reason: HOSPADM

## 2019-12-27 RX ORDER — DOCUSATE SODIUM 100 MG/1
100 CAPSULE, LIQUID FILLED ORAL 2 TIMES DAILY PRN
Status: DISCONTINUED | OUTPATIENT
Start: 2019-12-27 | End: 2019-12-28

## 2019-12-27 RX ORDER — ACETAMINOPHEN 325 MG/1
650 TABLET ORAL EVERY 6 HOURS PRN
Status: DISCONTINUED | OUTPATIENT
Start: 2019-12-27 | End: 2019-12-28 | Stop reason: HOSPADM

## 2019-12-27 RX ORDER — SODIUM CHLORIDE 0.9 % (FLUSH) 0.9 %
10 SYRINGE (ML) INJECTION EVERY 12 HOURS SCHEDULED
Status: DISCONTINUED | OUTPATIENT
Start: 2019-12-27 | End: 2019-12-28 | Stop reason: HOSPADM

## 2019-12-27 RX ORDER — LIDOCAINE 4 G/G
1 PATCH TOPICAL DAILY
Status: DISCONTINUED | OUTPATIENT
Start: 2019-12-27 | End: 2019-12-28 | Stop reason: HOSPADM

## 2019-12-27 RX ORDER — CALCIUM CARBONATE 200(500)MG
500 TABLET,CHEWABLE ORAL 3 TIMES DAILY
Status: DISCONTINUED | OUTPATIENT
Start: 2019-12-27 | End: 2019-12-28 | Stop reason: HOSPADM

## 2019-12-27 RX ORDER — SODIUM BICARBONATE 650 MG/1
650 TABLET ORAL 3 TIMES DAILY
Status: DISCONTINUED | OUTPATIENT
Start: 2019-12-27 | End: 2019-12-28

## 2019-12-27 RX ORDER — LEVETIRACETAM 500 MG/1
500 TABLET ORAL DAILY
Status: DISCONTINUED | OUTPATIENT
Start: 2019-12-27 | End: 2019-12-27 | Stop reason: DRUGHIGH

## 2019-12-27 RX ORDER — METOPROLOL SUCCINATE 25 MG/1
25 TABLET, EXTENDED RELEASE ORAL DAILY
Status: DISCONTINUED | OUTPATIENT
Start: 2019-12-27 | End: 2019-12-27

## 2019-12-27 RX ORDER — CALCITRIOL 0.25 UG/1
0.5 CAPSULE, LIQUID FILLED ORAL 2 TIMES DAILY
Status: DISCONTINUED | OUTPATIENT
Start: 2019-12-27 | End: 2019-12-28 | Stop reason: HOSPADM

## 2019-12-27 RX ORDER — TRAMADOL HYDROCHLORIDE 50 MG/1
50 TABLET ORAL EVERY 6 HOURS PRN
Status: DISCONTINUED | OUTPATIENT
Start: 2019-12-27 | End: 2019-12-28 | Stop reason: HOSPADM

## 2019-12-27 RX ORDER — COLCHICINE 0.6 MG/1
0.6 TABLET ORAL ONCE
Status: COMPLETED | OUTPATIENT
Start: 2019-12-27 | End: 2019-12-27

## 2019-12-27 RX ORDER — LIDOCAINE 4 G/G
1 PATCH TOPICAL DAILY
Status: DISCONTINUED | OUTPATIENT
Start: 2019-12-27 | End: 2019-12-27

## 2019-12-27 RX ORDER — GABAPENTIN 300 MG/1
300 CAPSULE ORAL 3 TIMES DAILY
Status: DISCONTINUED | OUTPATIENT
Start: 2019-12-27 | End: 2019-12-28 | Stop reason: HOSPADM

## 2019-12-27 RX ORDER — PREDNISONE 20 MG/1
20 TABLET ORAL ONCE
Status: COMPLETED | OUTPATIENT
Start: 2019-12-27 | End: 2019-12-27

## 2019-12-27 RX ORDER — ALBUTEROL SULFATE 90 UG/1
2 AEROSOL, METERED RESPIRATORY (INHALATION) EVERY 4 HOURS PRN
Status: DISCONTINUED | OUTPATIENT
Start: 2019-12-27 | End: 2019-12-28 | Stop reason: HOSPADM

## 2019-12-27 RX ORDER — LEVETIRACETAM 500 MG/1
500 TABLET ORAL 2 TIMES DAILY
Status: DISCONTINUED | OUTPATIENT
Start: 2019-12-27 | End: 2019-12-28 | Stop reason: HOSPADM

## 2019-12-27 RX ORDER — LANOLIN ALCOHOL/MO/W.PET/CERES
3 CREAM (GRAM) TOPICAL NIGHTLY PRN
Status: DISCONTINUED | OUTPATIENT
Start: 2019-12-27 | End: 2019-12-28 | Stop reason: HOSPADM

## 2019-12-27 RX ORDER — SODIUM CHLORIDE 0.9 % (FLUSH) 0.9 %
10 SYRINGE (ML) INJECTION PRN
Status: DISCONTINUED | OUTPATIENT
Start: 2019-12-27 | End: 2019-12-28 | Stop reason: HOSPADM

## 2019-12-27 RX ORDER — CALCIUM CARBONATE 200(500)MG
3 TABLET,CHEWABLE ORAL 3 TIMES DAILY
Status: DISCONTINUED | OUTPATIENT
Start: 2019-12-27 | End: 2019-12-27

## 2019-12-27 RX ORDER — ASPIRIN 81 MG/1
81 TABLET, CHEWABLE ORAL DAILY
Status: DISCONTINUED | OUTPATIENT
Start: 2019-12-27 | End: 2019-12-28 | Stop reason: HOSPADM

## 2019-12-27 RX ORDER — ATORVASTATIN CALCIUM 80 MG/1
80 TABLET, FILM COATED ORAL DAILY
Status: DISCONTINUED | OUTPATIENT
Start: 2019-12-27 | End: 2019-12-28 | Stop reason: HOSPADM

## 2019-12-27 RX ORDER — ISOSORBIDE MONONITRATE 30 MG/1
30 TABLET, EXTENDED RELEASE ORAL DAILY
Status: DISCONTINUED | OUTPATIENT
Start: 2019-12-27 | End: 2019-12-27

## 2019-12-27 RX ORDER — CLOPIDOGREL BISULFATE 75 MG/1
75 TABLET ORAL DAILY
COMMUNITY
Start: 2019-12-01

## 2019-12-27 RX ORDER — ISOSORBIDE MONONITRATE 30 MG/1
30 TABLET, EXTENDED RELEASE ORAL NIGHTLY
Status: DISCONTINUED | OUTPATIENT
Start: 2019-12-28 | End: 2019-12-28 | Stop reason: HOSPADM

## 2019-12-27 RX ORDER — NICOTINE 21 MG/24HR
1 PATCH, TRANSDERMAL 24 HOURS TRANSDERMAL DAILY
Status: DISCONTINUED | OUTPATIENT
Start: 2019-12-27 | End: 2019-12-28 | Stop reason: HOSPADM

## 2019-12-27 RX ORDER — GABAPENTIN 300 MG/1
600 CAPSULE ORAL 3 TIMES DAILY
COMMUNITY
Start: 2019-12-25

## 2019-12-27 RX ORDER — MULTIVITAMIN WITH FOLIC ACID 400 MCG
1 TABLET ORAL DAILY
Status: DISCONTINUED | OUTPATIENT
Start: 2019-12-27 | End: 2019-12-28 | Stop reason: HOSPADM

## 2019-12-27 RX ORDER — NICOTINE 21 MG/24HR
1 PATCH, TRANSDERMAL 24 HOURS TRANSDERMAL DAILY
Status: DISCONTINUED | OUTPATIENT
Start: 2019-12-27 | End: 2019-12-27

## 2019-12-27 RX ORDER — NIFEDIPINE 60 MG/1
60 TABLET, EXTENDED RELEASE ORAL DAILY
Status: DISCONTINUED | OUTPATIENT
Start: 2019-12-27 | End: 2019-12-27

## 2019-12-27 RX ADMIN — CARBIDOPA AND LEVODOPA 3 TABLET: 25; 100 TABLET ORAL at 22:05

## 2019-12-27 RX ADMIN — CARBIDOPA AND LEVODOPA 3 TABLET: 25; 100 TABLET ORAL at 07:59

## 2019-12-27 RX ADMIN — DULOXETINE HYDROCHLORIDE 60 MG: 60 CAPSULE, DELAYED RELEASE ORAL at 16:19

## 2019-12-27 RX ADMIN — CALCITRIOL 0.5 MCG: 0.25 CAPSULE ORAL at 22:05

## 2019-12-27 RX ADMIN — SODIUM CHLORIDE, PRESERVATIVE FREE 10 ML: 5 INJECTION INTRAVENOUS at 16:26

## 2019-12-27 RX ADMIN — ASPIRIN 81 MG 81 MG: 81 TABLET ORAL at 16:19

## 2019-12-27 RX ADMIN — LEVETIRACETAM 500 MG: 500 TABLET ORAL at 22:06

## 2019-12-27 RX ADMIN — THERA TABS 1 TABLET: TAB at 16:19

## 2019-12-27 RX ADMIN — TRAMADOL HYDROCHLORIDE 50 MG: 50 TABLET, FILM COATED ORAL at 22:16

## 2019-12-27 RX ADMIN — ANTACID TABLETS 500 MG: 500 TABLET, CHEWABLE ORAL at 22:05

## 2019-12-27 RX ADMIN — PANTOPRAZOLE SODIUM 40 MG: 40 TABLET, DELAYED RELEASE ORAL at 16:26

## 2019-12-27 RX ADMIN — SODIUM CHLORIDE, PRESERVATIVE FREE 10 ML: 5 INJECTION INTRAVENOUS at 22:17

## 2019-12-27 RX ADMIN — ANTACID TABLETS 500 MG: 500 TABLET, CHEWABLE ORAL at 16:19

## 2019-12-27 RX ADMIN — PREDNISONE 20 MG: 20 TABLET ORAL at 16:22

## 2019-12-27 RX ADMIN — COLCHICINE 0.6 MG: 0.6 TABLET, FILM COATED ORAL at 16:20

## 2019-12-27 RX ADMIN — ATORVASTATIN CALCIUM 80 MG: 80 TABLET, FILM COATED ORAL at 16:19

## 2019-12-27 RX ADMIN — TRAMADOL HYDROCHLORIDE 50 MG: 50 TABLET, FILM COATED ORAL at 06:30

## 2019-12-27 RX ADMIN — CARBIDOPA AND LEVODOPA 3 TABLET: 25; 100 TABLET ORAL at 16:19

## 2019-12-27 RX ADMIN — GABAPENTIN 300 MG: 300 CAPSULE ORAL at 22:06

## 2019-12-27 RX ADMIN — SODIUM BICARBONATE 650 MG: 650 TABLET ORAL at 22:05

## 2019-12-27 RX ADMIN — SODIUM BICARBONATE 650 MG: 650 TABLET ORAL at 16:22

## 2019-12-27 RX ADMIN — GABAPENTIN 300 MG: 300 CAPSULE ORAL at 16:19

## 2019-12-27 ASSESSMENT — PAIN SCALES - GENERAL
PAINLEVEL_OUTOF10: 5
PAINLEVEL_OUTOF10: 8
PAINLEVEL_OUTOF10: 0
PAINLEVEL_OUTOF10: 9

## 2019-12-27 ASSESSMENT — PAIN DESCRIPTION - LOCATION
LOCATION: FOOT;LEG
LOCATION: ABDOMEN;KNEE
LOCATION: FOOT

## 2019-12-27 ASSESSMENT — PAIN DESCRIPTION - PROGRESSION
CLINICAL_PROGRESSION: NOT CHANGED
CLINICAL_PROGRESSION: NOT CHANGED

## 2019-12-27 ASSESSMENT — PAIN - FUNCTIONAL ASSESSMENT
PAIN_FUNCTIONAL_ASSESSMENT: PREVENTS OR INTERFERES SOME ACTIVE ACTIVITIES AND ADLS
PAIN_FUNCTIONAL_ASSESSMENT: PREVENTS OR INTERFERES SOME ACTIVE ACTIVITIES AND ADLS

## 2019-12-27 ASSESSMENT — PAIN DESCRIPTION - PAIN TYPE
TYPE: CHRONIC PAIN
TYPE: ACUTE PAIN
TYPE: ACUTE PAIN;CHRONIC PAIN

## 2019-12-27 ASSESSMENT — PAIN DESCRIPTION - FREQUENCY
FREQUENCY: CONTINUOUS

## 2019-12-27 ASSESSMENT — PAIN DESCRIPTION - DESCRIPTORS
DESCRIPTORS: CRAMPING
DESCRIPTORS: CRUSHING;THROBBING
DESCRIPTORS: CRAMPING

## 2019-12-27 ASSESSMENT — PAIN DESCRIPTION - ORIENTATION
ORIENTATION: RIGHT
ORIENTATION: RIGHT;LEFT
ORIENTATION: RIGHT;LEFT

## 2019-12-27 ASSESSMENT — PAIN DESCRIPTION - ONSET
ONSET: ON-GOING

## 2019-12-27 ASSESSMENT — PAIN SCALES - WONG BAKER: WONGBAKER_NUMERICALRESPONSE: 4

## 2019-12-27 NOTE — ED NOTES
Pt returned from CT  On commode again no stool result c/o abd pain fullness     Efren Carvajal RN  12/26/19 1921

## 2019-12-27 NOTE — PROGRESS NOTES
NEPHROLOGY PROGRESS NOTE    CC: hyperkalemia, ESRD  HPI  Admitted after missing HD all week    SUBJECTIVE  Seen during HD today  No CP or SOB      SOC : no visitors          Scheduled Meds:   aspirin  81 mg Oral Daily    atorvastatin  80 mg Oral Daily    calcitRIOL  0.5 mcg Oral BID    carbidopa-levodopa  3 tablet Oral TID    DULoxetine  60 mg Oral Daily    gabapentin  300 mg Oral TID    isosorbide mononitrate  30 mg Oral Daily    labetalol  300 mg Oral BID    metoprolol succinate  25 mg Oral Daily    multivitamin  1 tablet Oral Daily    pantoprazole  40 mg Oral Daily    NIFEdipine  60 mg Oral Daily    sodium chloride flush  10 mL Intravenous 2 times per day    calcium carbonate  500 mg Oral TID    lidocaine  1 patch Transdermal Daily    nicotine  1 patch Transdermal Daily    levETIRAcetam  500 mg Oral BID    colchicine  0.6 mg Oral Once    predniSONE  20 mg Oral Once     Continuous Infusions:  PRN Meds:albuterol sulfate HFA, docusate sodium, melatonin, sodium chloride flush, acetaminophen, traMADol, zinc oxide      Objective:      Physical Exam  Wt Readings from Last 3 Encounters:   12/27/19 194 lb 10.7 oz (88.3 kg)   07/11/18 147 lb 6 oz (66.8 kg)   07/10/18 150 lb (68 kg)     Temp Readings from Last 3 Encounters:   12/27/19 97.8 °F (36.6 °C)   07/11/18 97 °F (36.1 °C) (Temporal)   05/11/18 98 °F (36.7 °C)     BP Readings from Last 3 Encounters:   12/27/19 (!) 177/95   07/11/18 (!) 189/100   07/06/18 (!) 199/106     Pulse Readings from Last 3 Encounters:   12/27/19 87   07/11/18 69   07/06/18 67     General appearance:  NAD. Neck: Supple, with full range of motion.  Trachea midline. Respiratory:  Normal respiratory effort. Clear to auscultation, bilaterally without Rales/Wheezes/Rhonchi. Cardiovascular:  Regular rate and rhythm with normal S1/S2 without murmurs, rubs or gallops.   Abdomen: Soft, non-tender, non-distended   Musculoskeletal:  No clubbing, cyanosis or edema bilaterally         Lab Review   Lab Results   Component Value Date    WBC 9.0 12/26/2019    HGB 10.7 (L) 12/26/2019    HCT 32.6 (L) 12/26/2019    MCV 97.7 12/26/2019     12/26/2019     Lab Results   Component Value Date     12/26/2019    K 6.1 12/26/2019    K 4.9 07/11/2018    CL 97 12/26/2019    CO2 16 12/26/2019     12/26/2019    CREATININE 29.1 12/26/2019    GLUCOSE 110 12/26/2019    CALCIUM 9.4 12/26/2019        Patient Active Problem List    Diagnosis Date Noted    Reducible left inguinal hernia     Elevated blood pressure reading 05/10/2018    Lupus anticoagulant positive 05/10/2018    Hx pulmonary embolism 05/10/2018    Chest pain 05/10/2018    SOB (shortness of breath)     Hyperparathyroidism ([facility] 04/17/2018    Hyperkalemia 02/04/2018    NSTEMI (non-ST elevated myocardial infarction) ([facility] 02/04/2018    Left foot pain 12/29/2017    Closed bimalleolar fracture of left ankle 12/26/2017    Anemia 11/20/2017    ESRD on hemodialysis ([facility] [facility] 11/20/2017    Left fibular fracture 11/20/2017    Leukocytosis 11/20/2017    Volume overload 08/16/2017    Right foot pain 08/04/2017    Uremia 08/04/2017    Medically noncompliant 05/22/2017    Hypoxia 05/13/2017    Pure hypercholesterolemia 10/07/2015    Tobacco abuse 10/06/2015    Pulmonary edema 04/21/2015    Anemia of chronic disease 03/26/2015    History of pulmonary embolism 09/20/2014    CHF (congestive heart failure) ([facility] [facility] 09/14/2014    C. difficile diarrhea 09/02/2014    Antiphospholipid antibody syndrome ([facility] [facility] 02/28/2014    Insomnia 09/10/2013    Atypical chest pain 07/06/2013    HTN (hypertension) 09/19/2011    HTN (hypertension), malignant 07/17/2011    ESRD (end stage renal disease) on dialysis ([address] 07/17/2011    Seizure disorder ([facility] 07/17/2011    Restless legs syndrome (RLS) 07/17/2011    Chronic pancreatitis ([address] 07/17/2011    Chronic pain disorder 02/11/2011    Essential hypertension 11/23/2010    Opioid type dependence ([facility] [address] 11/13/2007    End-stage renal disease on hemodialysis ([facility] [address] 10/23/2007       ASSESSMENT AND PLAN     1. Hyperkalemia- in setting of gross non-compliance with HD treatments, missed 2 scheduled HD this week  -medical management in ER  -HD today, 2 K bath  -low K diet  -discussed again about need to comply with HD  2. ESRD- HD MWF at [facility]  -missed HD all this week  -seen during HD today  -access: AVF  -TW=81 kg; likely will need exta UF/HD tomorrow, as he is 7.3 kg above TW today  3. HTN-resume home meds  4. Diarrhea- per primary team  5. Anemia- Target HGb 10-11

## 2019-12-27 NOTE — FLOWSHEET NOTE
Treatment time: 4.5 hours  Net UF: 3800 ml     Pre weight: 88.3 kg   Post weight: 84.4 kg  EDW: 81 kg     Access used: JOSE AVF  Access function: Positional with  ml/min     Medications or blood products given: none     Regular outpatient schedule: MWF     Summary of response to treatment: Experienced severe cramping when trying to remove additional fluid until UF rate decreased to minimum and NS flushes relieved cramping. Copy of dialysis treatment record placed in chart, to be scanned into EMR       12/27/19 0800 12/27/19 1430   Treatment   Time On  --  0934   Time Off  --  1415   Vital Signs   BP (!) 169/97 (!) 145/58   Temp 97.8 °F (36.6 °C) 98.4 °F (36.9 °C)   Pulse 93 95   Resp 20 20   Weight 194 lb 10.7 oz (88.3 kg) 186 lb 1.1 oz (84.4 kg)   Weight Method Actual;Standing scale Actual;Standing scale   Dry Weight 178 lb 9.2 oz (81 kg) 178 lb 9.2 oz (81 kg)   Access   Needle Size Used 15  --    Treatment Initiation   Dialyze Hours  --  4.5   Treatment  Initiation Universal Precautions maintained;Lines secured to patient; Connections secured;Prime given;Venous Parameters set; Arterial Parameters set; Air foam detector engaged;Dialysate;Saline line double clamped;Dialyzer;F180  --    Dialysis Bath   K+ (Potassium) 2  --    Ca+ (Calcium) 2.5  --    Na+ (Sodium) 138  --    HCO3 (Bicarb) 32.  --    Post-Hemodialysis Assessment   Post-Treatment Procedures  --  Blood returned; Access bleeding time < 10 minutes   Machine Disinfection Process  --  Exterior Machine Disinfection   Rinseback Volume (ml)  --  400 ml   Total Liters Processed (l/min)  --  98.1 l/min   Dialyzer Clearance  --  Moderately streaked   Hemodialysis Intake (ml)  --  600 ml   Hemodialysis Output (ml)  --  4800 ml   NET Removed (ml)  --  3800 ml   Tolerated Treatment  --  Fair

## 2019-12-27 NOTE — ED NOTES
Acknowledged pt by pt's name. Verified pt by name and date of birth. Checked arm band, allergies, reviewed past medical history. Introduced myself to patient  Duration of ED plan of care explained to patient  Explained planned tests and procedures  Thanked patient for coming to [facility].    Asked if there was anything else I could do for the patient before exiting room. CB in reach.      Joo Ann, RN  12/26/19 4828

## 2019-12-27 NOTE — ED NOTES
[facility] to transport patient to room [address] [place] [address] [id number]     Briana Spurling, RN  12/26/19 6847

## 2019-12-27 NOTE — PLAN OF CARE
Problem: Falls - Risk of:  Goal: Will remain free from falls  Description  Will remain free from falls  Outcome: Ongoing     Problem: Risk for Impaired Skin Integrity  Goal: Tissue integrity - skin and mucous membranes  Description  Structural intactness and normal physiological function of skin and  mucous membranes.   Outcome: Ongoing     Problem: Pain Control  Goal: Maintain pain level at or below patient's acceptable level (or 5 if patient is unable to determine acceptable level)  Outcome: Ongoing     Problem: Cardiovascular  Goal: Hemodynamic stability  Outcome: Ongoing     Problem: Respiratory  Goal: No pulmonary complications  Outcome: Ongoing     Problem: GI  Goal: No bowel complications  Outcome: Ongoing     Problem:   Goal: Adequate urinary output  Outcome: Ongoing     Problem: Musculor/Skeletal Functional Status  Goal: Highest potential functional level  Outcome: Ongoing

## 2019-12-27 NOTE — ED NOTES
Pt assitted up to bedside commode no diarrhea thought  Had to go  Pt shakey when moving around keeps eyes closed      Kathy Fam RN  12/26/19 1901

## 2019-12-27 NOTE — PROGRESS NOTES
Hospital Medicine Progress Note      Admit Date: 12/26/2019         Overnight Events: none    CC: F/U for diarrhea, pain    HPI:   Is a 80-year-old male with a history of end-stage renal disease on dialysis, hypertension, hyperlipidemia, anxiety/depression, RLS who presented to the ED with complaints of pain, diarrhea and missed dialysis. He was recently admitted at [facility] was being evaluated for his diarrhea however patient left AMA. After reading notes and doing chart review it was questioned whether or not patient left secondary to not getting narcotics. It also appears that patient follows with pain management and just saw him on 12/19. Interval History/Subjective:   Complains of bilateral knee pain and toe pain, denies fevers, chills, chest pain or shortness of breath. States that his diarrhea is improved    Review of Systems:     Comprehensive ROS negative except as mentioned above. Past Medical History:        Diagnosis Date    Arthritis     ESRD (end stage renal disease) on dialysis ([facility] [address]     Mon, Wed, Fri [address]     Hemodialysis patient ([facility] [address]     HTN (hypertension)     Neuropathy     Restless leg     Restless leg syndrome     Seizure ([facility] [address]     last sz 2016       Past Surgical History:        Procedure Laterality Date    AXILLARY-BRACHIAL BYPASS GRAFT Left     HERNIA REPAIR      PARATHYROIDECTOMY         Allergies:  Amlodipine besylate-valsartan; Losartan; Zolpidem; Diovan [valsartan]; Lisinopril; Lyrica [pregabalin]; Morphine; Naproxen; Ondansetron hcl; and Povidone-iodine    Past medical and surgical history reviewed. Any changes have been noted.      Scheduled and prn Medications:    Scheduled Meds:   aspirin  81 mg Oral Daily    atorvastatin  80 mg Oral Daily    calcitRIOL  0.5 mcg Oral BID    carbidopa-levodopa  3 tablet Oral TID    DULoxetine  60 mg Oral Daily    gabapentin  300 mg Oral TID    multivitamin  1 tablet Oral Daily    pantoprazole  40 mg Oral Daily    sodium chloride flush  10 mL Intravenous 2 times per day    calcium carbonate  500 mg Oral TID    lidocaine  1 patch Transdermal Daily    nicotine  1 patch Transdermal Daily    levETIRAcetam  500 mg Oral BID    colchicine  0.6 mg Oral Once    predniSONE  20 mg Oral Once    [START ON 12/28/2019] metoprolol tartrate  25 mg Oral Daily    [START ON 12/28/2019] isosorbide mononitrate  30 mg Oral Nightly    sodium bicarbonate  650 mg Oral TID     Continuous Infusions:  PRN Meds:.albuterol sulfate HFA, docusate sodium, melatonin, sodium chloride flush, acetaminophen, traMADol, zinc oxide    PHYSICAL EXAM:  BP (!) 145/58   Pulse 95   Temp 98.4 °F (36.9 °C)   Resp 20   Ht 5' 8\" (1.727 m)   Wt 186 lb 1.1 oz (84.4 kg)   SpO2 90%   BMI 28.29 kg/m²       Intake/Output Summary (Last 24 hours) at 12/27/2019 1554  Last data filed at 12/27/2019 1430  Gross per 24 hour   Intake 600 ml   Output 4800 ml   Net -4200 ml       General: Alert and oriented. Resting in bed in no apparent distress. HEENT: Normocephalic. Atraumatic. Pupils equal and reactive. EOM intact. Oral mucosa pink/moist/intact. Slurred speech, but family states this is the way he talks after dialysis  Neck: Supple. Symmetrical. Trachea midline. Lungs: Clear to auscultation bilaterally. Respirations even and unlabored. Chest: Exam unremarkable. Cardiac: S1/S2 noted. Regular Rhythm and rate. Abdomen/GI: Soft. Non-tender. Non-distended. BS+. Extremities: PP+. Atraumatic. No redness/cyanosis/edema noted. Brisk cap refill. Left upper extremity AV fistula, bruit and thrill positive  Skin: Dry and intact. No lesions noted. Neuro: Grossly intact. No focal deficits noted.      LABS:    Lab Results   Component Value Date    WBC 9.0 12/26/2019    HGB 10.7 (L) 12/26/2019    HCT 32.6 (L) 12/26/2019    MCV 97.7 12/26/2019     12/26/2019    LYMPHOPCT 12.1 12/26/2019    RBC 3.34 (L) 12/26/2019    MCH 32.1 12/26/2019    MCHC 32.9 12/26/2019    RDW 17.8 (H) 12/26/2019       Lab Results   Component Value Date    CREATININE 30.8 ([facility]) 12/27/2019    BUN 96 (HH) 12/27/2019     12/27/2019    K 6.7 (HH) 12/27/2019    CL 95 (L) 12/27/2019    CO2 8 (LL) 12/27/2019       Lab Results   Component Value Date    MG 2.70 12/26/2019       Lab Results   Component Value Date    ALT <5 (L) 12/26/2019    AST 18 12/26/2019    ALKPHOS 184 (H) 12/26/2019    BILITOT 0.3 12/26/2019        No flowsheet data found. Imaging:  CT ABDOMEN PELVIS WO CONTRAST Additional Contrast? None   Final Result   1. Nonspecific diarrheal disease   2. Polycystic kidney disease with nonobstructing nephrolithiasis   3. Colonic diverticulosis         XR CHEST STANDARD (2 VW)   Final Result   Interstitial pulmonary edema.   There is a possible sub pulmonic right pleural   effusion although elevation of the right diaphragm is favored         XR FOOT LEFT (MIN 3 VIEWS)    (Results Pending)   XR KNEE RIGHT (1-2 VIEWS)    (Results Pending)       Assessment & Plan:      End-stage renal disease on dialysis with hyperkalemia noncompliance  -nephrology consulted for dialysis  -Given Kayexalate in the ED with large BM this morning    Hyperkalemia  -Likely related to missed dialysis  -Monitor on telemetry-dialysis today  -imProved    Severe metabolic acidosis  -Likely related to end-stage renal disease with recurrent diarrhea  -Supplement with bicarb for now until dialysis can be obtained tomorrow    Diarrhea  -No recent antibiotic use  -Could be viral in nature  -Hospital protocol unable to send C. difficile at this time secondary to not having multiple bowel movements and following protocol  -Rapid flu negative  -Has history of C. difficile prior in 2017    Bilateral knee pain and toe pain  -Uric acid elevated  -Given a dose of colchicine  -1 dose of prednisone to see if this helps his pain, consider 5 days of treatment if helps    Uncontrolled

## 2019-12-27 NOTE — ED NOTES
[facility] Patient to be transported to Room 4121 via stretcher by [id number], ETA [phone]. Patient transported with bedside cardiac monitor. IV site clean, dry, and intact. MEWS score and pain assessed and documented. Updated patient on plan of care.      Opal Wilkins RN  12/26/19 9877

## 2019-12-27 NOTE — H&P
 HTN (hypertension)     Neuropathy     Restless leg     Restless leg syndrome     Seizure ([address]     last sz 2016       Past Surgical History:          Procedure Laterality Date    AXILLARY-BRACHIAL BYPASS GRAFT Left     HERNIA REPAIR      PARATHYROIDECTOMY         Medications Prior to Admission:      Prior to Admission medications    Medication Sig Start Date End Date Taking? Authorizing Provider   labetalol (NORMODYNE) 300 MG tablet Take 300 mg by mouth 2 times daily   Yes Historical Provider, MD   Multiple Vitamin (DAILY EZEKIEL PO) Take by mouth   Yes Historical Provider, MD   albuterol sulfate  (90 Base) MCG/ACT inhaler Inhale 2 puffs into the lungs 2/7/18  Yes Historical Provider, MD   promethazine (PHENERGAN) 25 MG tablet Take 25 mg by mouth 10/6/15  Yes Historical Provider, MD   aspirin 81 MG chewable tablet Take 1 tablet by mouth daily 5/12/18  Yes Deejay Altamirano MD   losartan (COZAAR) 100 MG tablet Take 100 mg by mouth daily   Yes Historical Provider, MD   gabapentin (NEURONTIN) 100 MG capsule Take 300 mg by mouth 3 times daily. Yes Historical Provider, MD   atorvastatin (LIPITOR) 40 MG tablet Take 80 mg by mouth daily    Yes Historical Provider, MD   metoprolol succinate (TOPROL XL) 100 MG extended release tablet Take 25 mg by mouth daily    Yes Historical Provider, MD   Cyanocobalamin (VITAMIN B 12 PO) Take 1,000 mcg by mouth daily    Yes Historical Provider, MD   levetiracetam (KEPPRA) 500 MG tablet Take 500 mg by mouth daily    Yes Historical Provider, MD   duloxetine (CYMBALTA) 60 MG capsule Take 60 mg by mouth daily.      Yes Historical Provider, MD   Melatonin 3 MG TABS Take 3 mg by mouth nightly as needed    Yes Historical Provider, MD   docusate sodium (COLACE) 100 MG capsule Take 1 capsule by mouth 2 times daily as needed for Constipation (take while on narcotic pain medication) 7/11/18   Radha Ash MD   promethazine (PHENERGAN) 25 MG tablet Take 25 mg by mouth

## 2019-12-28 VITALS
HEART RATE: 72 BPM | TEMPERATURE: 98.2 F | BODY MASS INDEX: 28.2 KG/M2 | DIASTOLIC BLOOD PRESSURE: 83 MMHG | WEIGHT: 186.07 LBS | RESPIRATION RATE: 19 BRPM | OXYGEN SATURATION: 92 % | SYSTOLIC BLOOD PRESSURE: 148 MMHG | HEIGHT: 68 IN

## 2019-12-28 LAB
ALBUMIN SERPL-MCNC: 4.2 G/DL (ref 3.4–5)
ANION GAP SERPL CALCULATED.3IONS-SCNC: 23 MMOL/L (ref 3–16)
BUN BLDV-MCNC: 41 MG/DL (ref 7–20)
CALCIUM SERPL-MCNC: 8.3 MG/DL (ref 8.3–10.6)
CHLORIDE BLD-SCNC: 94 MMOL/L (ref 99–110)
CO2: 20 MMOL/L (ref 21–32)
CREAT SERPL-MCNC: 13.5 MG/DL (ref 0.9–1.3)
GFR AFRICAN AMERICAN: 5
GFR NON-AFRICAN AMERICAN: 4
GLUCOSE BLD-MCNC: 96 MG/DL (ref 70–99)
HCT VFR BLD CALC: 31.7 % (ref 40.5–52.5)
HEMOGLOBIN: 10.6 G/DL (ref 13.5–17.5)
HEPATITIS B CORE TOTAL ANTIBODY: NEGATIVE
MAGNESIUM: 1.9 MG/DL (ref 1.8–2.4)
MCH RBC QN AUTO: 31.7 PG (ref 26–34)
MCHC RBC AUTO-ENTMCNC: 33.4 G/DL (ref 31–36)
MCV RBC AUTO: 95.1 FL (ref 80–100)
PDW BLD-RTO: 17.5 % (ref 12.4–15.4)
PHOSPHORUS: 3.7 MG/DL (ref 2.5–4.9)
PLATELET # BLD: 164 K/UL (ref 135–450)
PMV BLD AUTO: 10.1 FL (ref 5–10.5)
POTASSIUM SERPL-SCNC: 4.4 MMOL/L (ref 3.5–5.1)
RBC # BLD: 3.33 M/UL (ref 4.2–5.9)
SODIUM BLD-SCNC: 137 MMOL/L (ref 136–145)
WBC # BLD: 11.5 K/UL (ref 4–11)

## 2019-12-28 PROCEDURE — 2580000003 HC RX 258: Performed by: PEDIATRICS

## 2019-12-28 PROCEDURE — 80069 RENAL FUNCTION PANEL: CPT

## 2019-12-28 PROCEDURE — 36415 COLL VENOUS BLD VENIPUNCTURE: CPT

## 2019-12-28 PROCEDURE — 83735 ASSAY OF MAGNESIUM: CPT

## 2019-12-28 PROCEDURE — 6370000000 HC RX 637 (ALT 250 FOR IP): Performed by: PEDIATRICS

## 2019-12-28 PROCEDURE — 85027 COMPLETE CBC AUTOMATED: CPT

## 2019-12-28 PROCEDURE — 6370000000 HC RX 637 (ALT 250 FOR IP): Performed by: NURSE PRACTITIONER

## 2019-12-28 RX ORDER — LIDOCAINE HYDROCHLORIDE 10 MG/ML
5 INJECTION, SOLUTION INFILTRATION; PERINEURAL ONCE
Status: DISCONTINUED | OUTPATIENT
Start: 2019-12-28 | End: 2019-12-28 | Stop reason: HOSPADM

## 2019-12-28 RX ADMIN — GABAPENTIN 300 MG: 300 CAPSULE ORAL at 14:12

## 2019-12-28 RX ADMIN — TRAMADOL HYDROCHLORIDE 50 MG: 50 TABLET, FILM COATED ORAL at 14:12

## 2019-12-28 RX ADMIN — PANTOPRAZOLE SODIUM 40 MG: 40 TABLET, DELAYED RELEASE ORAL at 08:57

## 2019-12-28 RX ADMIN — CALCITRIOL 0.5 MCG: 0.25 CAPSULE ORAL at 08:57

## 2019-12-28 RX ADMIN — SODIUM CHLORIDE, PRESERVATIVE FREE 10 ML: 5 INJECTION INTRAVENOUS at 08:58

## 2019-12-28 RX ADMIN — GABAPENTIN 300 MG: 300 CAPSULE ORAL at 08:57

## 2019-12-28 RX ADMIN — DULOXETINE HYDROCHLORIDE 60 MG: 60 CAPSULE, DELAYED RELEASE ORAL at 08:58

## 2019-12-28 RX ADMIN — CARBIDOPA AND LEVODOPA 3 TABLET: 25; 100 TABLET ORAL at 14:12

## 2019-12-28 RX ADMIN — TRAMADOL HYDROCHLORIDE 50 MG: 50 TABLET, FILM COATED ORAL at 05:29

## 2019-12-28 RX ADMIN — CARBIDOPA AND LEVODOPA 3 TABLET: 25; 100 TABLET ORAL at 08:57

## 2019-12-28 RX ADMIN — METOPROLOL TARTRATE 25 MG: 25 TABLET ORAL at 08:58

## 2019-12-28 RX ADMIN — ATORVASTATIN CALCIUM 80 MG: 80 TABLET, FILM COATED ORAL at 08:57

## 2019-12-28 RX ADMIN — LEVETIRACETAM 500 MG: 500 TABLET ORAL at 08:57

## 2019-12-28 RX ADMIN — ANTACID TABLETS 500 MG: 500 TABLET, CHEWABLE ORAL at 08:58

## 2019-12-28 RX ADMIN — SODIUM BICARBONATE 650 MG: 650 TABLET ORAL at 08:57

## 2019-12-28 RX ADMIN — THERA TABS 1 TABLET: TAB at 08:57

## 2019-12-28 RX ADMIN — ANTACID TABLETS 500 MG: 500 TABLET, CHEWABLE ORAL at 14:12

## 2019-12-28 RX ADMIN — ASPIRIN 81 MG 81 MG: 81 TABLET ORAL at 08:57

## 2019-12-28 RX ADMIN — SODIUM BICARBONATE 650 MG: 650 TABLET ORAL at 14:12

## 2019-12-28 ASSESSMENT — PAIN DESCRIPTION - ORIENTATION
ORIENTATION: RIGHT
ORIENTATION: RIGHT

## 2019-12-28 ASSESSMENT — PAIN DESCRIPTION - PROGRESSION
CLINICAL_PROGRESSION: NOT CHANGED

## 2019-12-28 ASSESSMENT — PAIN DESCRIPTION - ONSET
ONSET: ON-GOING
ONSET: ON-GOING

## 2019-12-28 ASSESSMENT — PAIN DESCRIPTION - FREQUENCY
FREQUENCY: CONTINUOUS
FREQUENCY: CONTINUOUS

## 2019-12-28 ASSESSMENT — PAIN DESCRIPTION - LOCATION
LOCATION: KNEE
LOCATION: KNEE

## 2019-12-28 ASSESSMENT — PAIN DESCRIPTION - PAIN TYPE
TYPE: ACUTE PAIN;CHRONIC PAIN
TYPE: ACUTE PAIN;CHRONIC PAIN

## 2019-12-28 ASSESSMENT — PAIN SCALES - GENERAL
PAINLEVEL_OUTOF10: 5
PAINLEVEL_OUTOF10: 9
PAINLEVEL_OUTOF10: 7

## 2019-12-28 ASSESSMENT — PAIN DESCRIPTION - DESCRIPTORS
DESCRIPTORS: DISCOMFORT
DESCRIPTORS: DISCOMFORT

## 2019-12-28 NOTE — CARE COORDINATION
LSW reviewed chart. Call placed to  [facility] to confirm his status there. His schedule is [facility]  Phone:  [phone] fax.   Jerald Gleason, [place]     Case Management   [phone]    12/28/2019  9:30 AM

## 2019-12-28 NOTE — PLAN OF CARE
Problem: Falls - Risk of:  Goal: Will remain free from falls  Description  Will remain free from falls  Outcome: Ongoing  Goal: Absence of physical injury  Description  Absence of physical injury  Outcome: Ongoing     Problem: Risk for Impaired Skin Integrity  Goal: Tissue integrity - skin and mucous membranes  Description  Structural intactness and normal physiological function of skin and  mucous membranes.   Outcome: Ongoing     Problem: Pain Control  Goal: Maintain pain level at or below patient's acceptable level (or 5 if patient is unable to determine acceptable level)  Outcome: Ongoing  Goal: Improvement in pain related behaviors BP/HR WNL  Outcome: Ongoing     Problem: Neurological  Goal: Maximum potential motor/sensory/cognitive function  Outcome: Ongoing     Problem: Respiratory  Goal: No pulmonary complications  Outcome: Ongoing  Goal: O2 Sat > 90%  Outcome: Ongoing  Goal: Supplemental O2 requirements decreased  Outcome: Ongoing  Goal: Agreement to quit smoking  Outcome: Ongoing  Goal: Pneumonia vaccine at discharge as needed  Outcome: Ongoing     Problem: GI  Goal: No bowel complications  Outcome: Ongoing  Goal: Bowel movement at least every other day  Outcome: Ongoing     Problem:   Goal: Adequate urinary output  Outcome: Ongoing  Goal: No urinary complication  Outcome: Ongoing     Problem: Musculor/Skeletal Functional Status  Goal: Highest potential functional level  Outcome: Ongoing  Goal: Absence of falls  Outcome: Ongoing     Problem: Pain:  Goal: Pain level will decrease  Description  Pain level will decrease  Outcome: Ongoing  Goal: Control of acute pain  Description  Control of acute pain  Outcome: Ongoing  Goal: Control of chronic pain  Description  Control of chronic pain  Outcome: Ongoing

## 2019-12-28 NOTE — PROGRESS NOTES
First attempt at treatment 0800, Pt refused until orders given for Benadryl and lidocain. Second attempt at tx 1400, Pt refused because he was not pleased with the question I asked concerning needle placement. Pt was asked if his outpatient clinic places the arterial needle down or up. Pt mis understood the question as a statement and became argumentative in stating, \" you will do what the patient wants, not what you want\" attempted to explain my question was not a statement I was concerned about needle position and angle of AVG. Pt then refused tx stating, \" they can send another nurse\".    Report given to primary nurse BURAK GOYAL

## 2019-12-28 NOTE — PLAN OF CARE
Problem: Falls - Risk of:  Goal: Will remain free from falls  Description  Will remain free from falls  12/28/2019 1045 by Kimber Colmenares RN  Outcome: Ongoing  12/28/2019 0616 by Kamala Dailey RN  Outcome: Ongoing  Goal: Absence of physical injury  Description  Absence of physical injury  12/28/2019 1045 by Kimber Colmenares RN  Outcome: Ongoing  12/28/2019 0616 by Kamala Dailey RN  Outcome: Ongoing     Problem: Risk for Impaired Skin Integrity  Goal: Tissue integrity - skin and mucous membranes  Description  Structural intactness and normal physiological function of skin and  mucous membranes.   12/28/2019 1045 by Kimber Colmenares RN  Outcome: Ongoing  12/28/2019 0616 by Kamala Dailey RN  Outcome: Ongoing     Problem: Pain Control  Goal: Maintain pain level at or below patient's acceptable level (or 5 if patient is unable to determine acceptable level)  12/28/2019 1045 by Kimber Colmenares RN  Outcome: Ongoing  12/28/2019 0616 by Kamala Dailey RN  Outcome: Ongoing  Goal: Improvement in pain related behaviors BP/HR WNL  12/28/2019 1045 by Kimber Colmenares RN  Outcome: Ongoing  12/28/2019 0616 by Kamala Dailey RN  Outcome: Ongoing

## 2019-12-28 NOTE — DISCHARGE SUMMARY
Hospital Medicine Discharge Summary      Patient ID: Nesha Hendrikcs      Patient's PCP: No primary care provider on file. Admit Date: 12/26/2019     Discharge Date: 12/28/2019      Admitting Physician: Norma Velázquez MD     Discharge Provider: CHEIKH Hirsch CNP     Consults: nephrology    Discharge Diagnoses: Active Hospital Problems    Diagnosis Date Noted    Hyperkalemia [E87.5] 02/04/2018       Disposition:  [x] Home  [] Home with home health [] Rehab [] Psych [] SNF  [] LTAC  [] Long term nursing home or group home [] Transfer to ICU  [] Transfer to PCU [] Other:    Ck Souza MD  [address]  [phone]    Schedule an appointment as soon as possible for a visit  follow up apt. Discharge Condition: stable      Code Status:  Prior     Activity: activity as tolerated    Diet: No diet orders on file     Discharge Medications:     Discharge Medication List as of 12/28/2019  4:34 PM           Details   gabapentin (NEURONTIN) 300 MG capsule Take 600 mg by mouth 3 times daily. Historical Med      clopidogrel (PLAVIX) 75 MG tablet Take 75 mg by mouth dailyHistorical Med      metoprolol tartrate (LOPRESSOR) 25 MG tablet Take 25 mg by mouth dailyHistorical Med      Multiple Vitamin (DAILY EZEKIEL PO) Take 1 tablet by mouth daily Historical Med      albuterol sulfate  (90 Base) MCG/ACT inhaler Inhale 2 puffs into the lungs every 6 hours as needed Historical Med      aspirin 81 MG chewable tablet Take 1 tablet by mouth daily, Disp-30 tablet, R-0Print      losartan (COZAAR) 100 MG tablet Take 100 mg by mouth dailyHistorical Med      isosorbide mononitrate (IMDUR) 30 MG extended release tablet Take 30 mg by mouth nightly Historical Med      atorvastatin (LIPITOR) 40 MG tablet Take 80 mg by mouth daily Historical Med      pantoprazole (PROTONIX) 40 MG tablet Take 40 mg by mouth dailyHistorical Med      Cyanocobalamin (VITAMIN B 12 PO) Take 1,000 mcg by mouth daily Historical Med      levetiracetam (KEPPRA) 500 MG tablet Take 500 mg by mouth 2 times daily Historical Med      duloxetine (CYMBALTA) 60 MG capsule Take 60 mg by mouth daily. Melatonin 3 MG TABS Take 3 mg by mouth nightly as needed Historical Med      carbidopa-levodopa (SINEMET)  MG per tablet Take 3 tablets by mouth 3 times daily. The patient was seen and examined on day of discharge and this discharge summary is in conjunction with any daily progress note from day of discharge. Hospital Course:   Adi Skaggs is a 60-year-old male with a history of end-stage renal disease on dialysis, hypertension, hyperlipidemia, anxiety/depression, RLS who presented to the ED with complaints of pain, diarrhea and missed dialysis  Over the past week. He was recently admitted at [facility] was being evaluated for his diarrhea however patient left AMA. After reading notes and doing chart review it was questioned whether or not patient left secondary to not getting narcotics. It also appears that patient follows with pain management and just saw him on 12/19. Patient presented with complaints of bilateral knee pain and toe pain. Uric acid noted to be 11. He was given a dose of colchicine as well as prednisone with much improvement in his symptoms. He did receive dialysis for hyperkalemia. Post labs with severe metabolic acidosis with a bicarb of 8. He was supplemented with 24 hours of p.o. bicarb. Dialysis was attempted on the day of discharge however declined until Benadryl and lidocaine could be ordered. He then declined in the afternoon after a disagreement with the dialysis nurse. I asked nursing to call nephrology who agreed that he could be discharged. He will follow-up tomorrow at his normal dialysis center of his regular time for dialysis tomorrow. As for his diarrhea he has had no further episodes during his stay.   He did get Kayexalate inducing one

## 2019-12-28 NOTE — PROGRESS NOTES
This RN spoke with nephrology MD.  Rachel Barragan MD okay with patient discharging today if he agrees to go to his dialysis facility tomorrow 12/29/19. This RN spoke with patient, patient agrees he will go to his dialysis tomorrow and be discharged today. Hospitalist NP updated.

## 2019-12-28 NOTE — PROGRESS NOTES
NEPHROLOGY PROGRESS NOTE    CC: hyperkalemia, ESRD  HPI  Admitted after missing HD all week    SUBJECTIVE  Resting comfortably  No CP or SOB      SOC : no visitors          Scheduled Meds:   aspirin  81 mg Oral Daily    atorvastatin  80 mg Oral Daily    calcitRIOL  0.5 mcg Oral BID    carbidopa-levodopa  3 tablet Oral TID    DULoxetine  60 mg Oral Daily    gabapentin  300 mg Oral TID    multivitamin  1 tablet Oral Daily    pantoprazole  40 mg Oral Daily    sodium chloride flush  10 mL Intravenous 2 times per day    calcium carbonate  500 mg Oral TID    lidocaine  1 patch Transdermal Daily    nicotine  1 patch Transdermal Daily    levETIRAcetam  500 mg Oral BID    metoprolol tartrate  25 mg Oral Daily    isosorbide mononitrate  30 mg Oral Nightly    sodium bicarbonate  650 mg Oral TID     Continuous Infusions:  PRN Meds:albuterol sulfate HFA, melatonin, sodium chloride flush, acetaminophen, traMADol, zinc oxide      Objective:      Physical Exam  Wt Readings from Last 3 Encounters:   12/27/19 186 lb 1.1 oz (84.4 kg)   07/11/18 147 lb 6 oz (66.8 kg)   07/10/18 150 lb (68 kg)     Temp Readings from Last 3 Encounters:   12/28/19 97.6 °F (36.4 °C) (Oral)   07/11/18 97 °F (36.1 °C) (Temporal)   05/11/18 98 °F (36.7 °C)     BP Readings from Last 3 Encounters:   12/28/19 (!) 165/96   07/11/18 (!) 189/100   07/06/18 (!) 199/106     Pulse Readings from Last 3 Encounters:   12/28/19 94   07/11/18 69   07/06/18 67     General appearance:  NAD. Neck: Supple, with full range of motion.  Trachea midline. Respiratory:  Normal respiratory effort. Clear to auscultation, bilaterally without Rales/Wheezes/Rhonchi. Cardiovascular:  Regular rate and rhythm with normal S1/S2 without murmurs, rubs or gallops.   Abdomen: Soft, non-tender, non-distended   Musculoskeletal:  No clubbing, cyanosis or edema bilaterally         Lab Review   Lab Results   Component Value Date    WBC 11.5 (H) 12/28/2019    HGB 10.6 (L) 12/28/2019    HCT 31.7 (L) 12/28/2019    MCV 95.1 12/28/2019     12/28/2019     Lab Results   Component Value Date     12/28/2019    K 4.4 12/28/2019    K 4.9 07/11/2018    CL 94 12/28/2019    CO2 20 12/28/2019    BUN 41 12/28/2019    CREATININE 13.5 12/28/2019    GLUCOSE 96 12/28/2019    CALCIUM 8.3 12/28/2019        Patient Active Problem List    Diagnosis Date Noted    Reducible left inguinal hernia     Elevated blood pressure reading 05/10/2018    Lupus anticoagulant positive 05/10/2018    Hx pulmonary embolism 05/10/2018    Chest pain 05/10/2018    SOB (shortness of breath)     Hyperparathyroidism ([address] 04/17/2018    Hyperkalemia 02/04/2018    NSTEMI (non-ST elevated myocardial infarction) ([facility] 02/04/2018    Left foot pain 12/29/2017    Closed bimalleolar fracture of left ankle 12/26/2017    Anemia 11/20/2017    ESRD on hemodialysis ([address] 11/20/2017    Left fibular fracture 11/20/2017    Leukocytosis 11/20/2017    Volume overload 08/16/2017    Right foot pain 08/04/2017    Uremia 08/04/2017    Medically noncompliant 05/22/2017    Hypoxia 05/13/2017    Pure hypercholesterolemia 10/07/2015    Tobacco abuse 10/06/2015    Pulmonary edema 04/21/2015    Anemia of chronic disease 03/26/2015    History of pulmonary embolism 09/20/2014    CHF (congestive heart failure) ([facility] 09/14/2014    C. difficile diarrhea 09/02/2014    Antiphospholipid antibody syndrome (HCC) 02/28/2014    Insomnia 09/10/2013    Atypical chest pain 07/06/2013    HTN (hypertension) 09/19/2011    HTN (hypertension), malignant 07/17/2011    ESRD (end stage renal disease) on dialysis ([address] 07/17/2011    Seizure disorder ([facility] 07/17/2011    Restless legs syndrome (RLS) 07/17/2011    Chronic pancreatitis ([facility] 07/17/2011    Chronic pain disorder 02/11/2011    Essential hypertension 11/23/2010    Opioid type dependence ([address] 11/13/2007    End-stage renal disease on hemodialysis ([facility] 10/23/2007

## 2019-12-28 NOTE — PROGRESS NOTES
Patient is AAO x4. RR even and unlabored. VS and recorded. Patient c/o leg cramping; stated \"always have it after dialysis\"; resolved with due meds. Patient noted UAL in room with steady gait; denies dizziness. Patient tolerated 100% of dinner with no difficulty. No BM reported during this shift. Fall prec in place. Call-light eithin reach.  Electronically signed by Jessica Kraft RN on 12/27/2019 at 7:10 PM

## 2019-12-28 NOTE — PROGRESS NOTES
Patient is ambulating in room. Alert and oriented x 4. No s/s of distress. Speech clear. Normal gait and posture. Tolerated dinner 100%.  Electronically signed by Houston Valentin RN on 12/27/2019 at 8:15 PM

## 2020-04-30 NOTE — ED NOTES
Pt to be admitted for further testing and evaluation. Pt updated on POC. All questions answered.      Ander Gomes RN  12/26/19 4913 Patient Needs Assistance to Leave Residence...

## 2021-07-15 ENCOUNTER — HOSPITAL ENCOUNTER (EMERGENCY)
Age: 51
Discharge: HOME | End: 2021-07-16
Attending: STUDENT IN AN ORGANIZED HEALTH CARE EDUCATION/TRAINING PROGRAM
Payer: MEDICARE

## 2021-07-15 ENCOUNTER — APPOINTMENT (OUTPATIENT)
Dept: GENERAL RADIOLOGY | Age: 51
End: 2021-07-15
Payer: MEDICARE

## 2021-07-15 DIAGNOSIS — R07.81 PLEURITIC CHEST PAIN: Primary | ICD-10-CM

## 2021-07-15 DIAGNOSIS — I10 HTN (HYPERTENSION), MALIGNANT: ICD-10-CM

## 2021-07-15 PROCEDURE — 85025 COMPLETE CBC W/AUTO DIFF WBC: CPT

## 2021-07-15 PROCEDURE — 71046 X-RAY EXAM CHEST 2 VIEWS: CPT

## 2021-07-15 PROCEDURE — 80076 HEPATIC FUNCTION PANEL: CPT

## 2021-07-15 PROCEDURE — 85379 FIBRIN DEGRADATION QUANT: CPT

## 2021-07-15 PROCEDURE — 83605 ASSAY OF LACTIC ACID: CPT

## 2021-07-15 PROCEDURE — 6370000000 HC RX 637 (ALT 250 FOR IP): Performed by: STUDENT IN AN ORGANIZED HEALTH CARE EDUCATION/TRAINING PROGRAM

## 2021-07-15 PROCEDURE — 99283 EMERGENCY DEPT VISIT LOW MDM: CPT

## 2021-07-15 PROCEDURE — 83880 ASSAY OF NATRIURETIC PEPTIDE: CPT

## 2021-07-15 PROCEDURE — 93005 ELECTROCARDIOGRAM TRACING: CPT | Performed by: STUDENT IN AN ORGANIZED HEALTH CARE EDUCATION/TRAINING PROGRAM

## 2021-07-15 PROCEDURE — 82803 BLOOD GASES ANY COMBINATION: CPT

## 2021-07-15 PROCEDURE — 84484 ASSAY OF TROPONIN QUANT: CPT

## 2021-07-15 PROCEDURE — 80048 BASIC METABOLIC PNL TOTAL CA: CPT

## 2021-07-15 RX ORDER — OXYCODONE HYDROCHLORIDE AND ACETAMINOPHEN 5; 325 MG/1; MG/1
1 TABLET ORAL ONCE
Status: COMPLETED | OUTPATIENT
Start: 2021-07-15 | End: 2021-07-15

## 2021-07-15 RX ADMIN — OXYCODONE HYDROCHLORIDE AND ACETAMINOPHEN 1 TABLET: 5; 325 TABLET ORAL at 23:28

## 2021-07-15 ASSESSMENT — PAIN DESCRIPTION - ORIENTATION: ORIENTATION: LEFT

## 2021-07-15 ASSESSMENT — PAIN DESCRIPTION - ONSET: ONSET: ON-GOING

## 2021-07-15 ASSESSMENT — PAIN DESCRIPTION - DESCRIPTORS: DESCRIPTORS: SHARP;SHOOTING;STABBING

## 2021-07-15 ASSESSMENT — PAIN DESCRIPTION - LOCATION: LOCATION: RIB CAGE

## 2021-07-15 ASSESSMENT — PAIN SCALES - GENERAL
PAINLEVEL_OUTOF10: 10
PAINLEVEL_OUTOF10: 10

## 2021-07-15 ASSESSMENT — PAIN DESCRIPTION - FREQUENCY: FREQUENCY: CONTINUOUS

## 2021-07-15 ASSESSMENT — PAIN DESCRIPTION - PAIN TYPE: TYPE: ACUTE PAIN

## 2021-07-15 ASSESSMENT — PAIN DESCRIPTION - PROGRESSION: CLINICAL_PROGRESSION: GRADUALLY WORSENING

## 2021-07-16 ENCOUNTER — APPOINTMENT (OUTPATIENT)
Dept: CT IMAGING | Age: 51
End: 2021-07-16
Payer: MEDICARE

## 2021-07-16 VITALS
OXYGEN SATURATION: 96 % | DIASTOLIC BLOOD PRESSURE: 81 MMHG | HEIGHT: 68 IN | HEART RATE: 69 BPM | SYSTOLIC BLOOD PRESSURE: 178 MMHG | WEIGHT: 186.95 LBS | BODY MASS INDEX: 28.33 KG/M2 | RESPIRATION RATE: 14 BRPM | TEMPERATURE: 98.5 F

## 2021-07-16 LAB
ALBUMIN SERPL-MCNC: 4.3 G/DL (ref 3.4–5)
ALP BLD-CCNC: 108 U/L (ref 40–129)
ALT SERPL-CCNC: 10 U/L (ref 10–40)
ANION GAP SERPL CALCULATED.3IONS-SCNC: 20 MMOL/L (ref 3–16)
AST SERPL-CCNC: 25 U/L (ref 15–37)
BASE EXCESS VENOUS: 1.9 MMOL/L
BASOPHILS ABSOLUTE: 0.1 K/UL (ref 0–0.2)
BASOPHILS RELATIVE PERCENT: 1.3 %
BILIRUB SERPL-MCNC: 0.3 MG/DL (ref 0–1)
BILIRUBIN DIRECT: <0.2 MG/DL (ref 0–0.3)
BILIRUBIN, INDIRECT: NORMAL MG/DL (ref 0–1)
BUN BLDV-MCNC: 35 MG/DL (ref 7–20)
CALCIUM SERPL-MCNC: 7.7 MG/DL (ref 8.3–10.6)
CARBOXYHEMOGLOBIN: 4.5 %
CHLORIDE BLD-SCNC: 96 MMOL/L (ref 99–110)
CO2: 20 MMOL/L (ref 21–32)
CREAT SERPL-MCNC: 11 MG/DL (ref 0.9–1.3)
D DIMER: 704 NG/ML DDU (ref 0–229)
EKG ATRIAL RATE: 75 BPM
EKG DIAGNOSIS: NORMAL
EKG P AXIS: 42 DEGREES
EKG P-R INTERVAL: 172 MS
EKG Q-T INTERVAL: 434 MS
EKG QRS DURATION: 100 MS
EKG QTC CALCULATION (BAZETT): 484 MS
EKG R AXIS: 3 DEGREES
EKG T AXIS: 26 DEGREES
EKG VENTRICULAR RATE: 75 BPM
EOSINOPHILS ABSOLUTE: 0.2 K/UL (ref 0–0.6)
EOSINOPHILS RELATIVE PERCENT: 2.4 %
GFR AFRICAN AMERICAN: 6
GFR NON-AFRICAN AMERICAN: 5
GLUCOSE BLD-MCNC: 92 MG/DL (ref 70–99)
HCO3 VENOUS: 27 MMOL/L (ref 23–29)
HCT VFR BLD CALC: 31.6 % (ref 40.5–52.5)
HEMOGLOBIN: 10.8 G/DL (ref 13.5–17.5)
LACTIC ACID: 1.3 MMOL/L (ref 0.4–2)
LYMPHOCYTES ABSOLUTE: 1.3 K/UL (ref 1–5.1)
LYMPHOCYTES RELATIVE PERCENT: 14.8 %
MCH RBC QN AUTO: 32.2 PG (ref 26–34)
MCHC RBC AUTO-ENTMCNC: 34 G/DL (ref 31–36)
MCV RBC AUTO: 94.6 FL (ref 80–100)
METHEMOGLOBIN VENOUS: 0.8 %
MONOCYTES ABSOLUTE: 0.9 K/UL (ref 0–1.3)
MONOCYTES RELATIVE PERCENT: 10.1 %
NEUTROPHILS ABSOLUTE: 6.5 K/UL (ref 1.7–7.7)
NEUTROPHILS RELATIVE PERCENT: 71.4 %
O2 SAT, VEN: 89 %
O2 THERAPY: NORMAL
PCO2, VEN: 44.1 MMHG (ref 40–50)
PDW BLD-RTO: 16.1 % (ref 12.4–15.4)
PH VENOUS: 7.4 (ref 7.35–7.45)
PLATELET # BLD: 159 K/UL (ref 135–450)
PMV BLD AUTO: 8.9 FL (ref 5–10.5)
PO2, VEN: 56 MMHG
POTASSIUM REFLEX MAGNESIUM: 4.9 MMOL/L (ref 3.5–5.1)
PRO-BNP: ABNORMAL PG/ML (ref 0–124)
RBC # BLD: 3.34 M/UL (ref 4.2–5.9)
SODIUM BLD-SCNC: 136 MMOL/L (ref 136–145)
TCO2 CALC VENOUS: 29 MMOL/L
TOTAL PROTEIN: 7.7 G/DL (ref 6.4–8.2)
TROPONIN: 0.07 NG/ML
WBC # BLD: 9.1 K/UL (ref 4–11)

## 2021-07-16 PROCEDURE — 71260 CT THORAX DX C+: CPT

## 2021-07-16 PROCEDURE — 93010 ELECTROCARDIOGRAM REPORT: CPT | Performed by: INTERNAL MEDICINE

## 2021-07-16 PROCEDURE — 96374 THER/PROPH/DIAG INJ IV PUSH: CPT

## 2021-07-16 PROCEDURE — 6360000004 HC RX CONTRAST MEDICATION: Performed by: STUDENT IN AN ORGANIZED HEALTH CARE EDUCATION/TRAINING PROGRAM

## 2021-07-16 PROCEDURE — 6360000002 HC RX W HCPCS: Performed by: STUDENT IN AN ORGANIZED HEALTH CARE EDUCATION/TRAINING PROGRAM

## 2021-07-16 RX ORDER — CYCLOBENZAPRINE HCL 10 MG
10 TABLET ORAL 3 TIMES DAILY PRN
Qty: 21 TABLET | Refills: 0 | Status: SHIPPED | OUTPATIENT
Start: 2021-07-16 | End: 2021-07-26

## 2021-07-16 RX ORDER — ACETAMINOPHEN 500 MG
1000 TABLET ORAL EVERY 6 HOURS PRN
Qty: 180 TABLET | Refills: 0 | Status: SHIPPED | OUTPATIENT
Start: 2021-07-16

## 2021-07-16 RX ADMIN — HYDROMORPHONE HYDROCHLORIDE 0.5 MG: 1 INJECTION, SOLUTION INTRAMUSCULAR; INTRAVENOUS; SUBCUTANEOUS at 03:38

## 2021-07-16 RX ADMIN — IOPAMIDOL 75 ML: 755 INJECTION, SOLUTION INTRAVENOUS at 03:20

## 2021-07-16 ASSESSMENT — PAIN SCALES - GENERAL: PAINLEVEL_OUTOF10: 10

## 2021-07-16 NOTE — ED TRIAGE NOTES
patient came in from home complaining of left rib pain x2 days. States the pain began after after changing a car tire. Pain is 8/10 sharp on his left upper rib. States deep breath and movement makes the pain worse. Dialysis patient. A&O x4. Hypertensive.

## 2021-07-16 NOTE — ED PROVIDER NOTES
[address] [facility]      Pt Name: Jeff Soto  MRN: [id number]  [place] 1970  Date of evaluation: 7/15/2021  Provider: Pina Putnam MD    [address] [place] [address]       Chief Complaint   Patient presents with    Rib Pain     left side rib pain after changing a tire          HISTORY OF PRESENT ILLNESS   (Location/Symptom, Timing/Onset,Context/Setting, Quality, Duration, Modifying Factors, Severity)  Note limiting factors. Jeff Soto is a 46 y.o. male who presents to the emergency department c/o left sided chest pain that started approx 1 day ago after changing a tire, localized to the L lateral chest wall, progressively worse, described as sharp and severe, worse with deep inspiration, partially alleviated with shallow respirations, +ve SOB. Pt has significant h/o ESRD on HD for which he is compliant, also HLD, HTN, prior PE, lupus anticoagulant syndrome. Denies fevers, cough, nausea, vomiting, diaphoresis. Symptoms not otherwise alleviated or exacerbated by other factors. NursingNotes were reviewed. REVIEW OF SYSTEMS    (2-9 systems for level 4, 10 or more for level 5)       Constitutional: No fever or chills. Eye: No visual disturbances. No eye pain. Ear/Nose/Mouth/Throat: No nasal congestion. No sore throat. Respiratory: No cough, + shortness of breath, No sputum production. Cardiovascular: + chest pain. No palpitations. Gastrointestinal: No abdominal pain. No nausea or vomiting  Genitourinary: No dysuria. No hematuria. Hematology/Lymphatics: No bleeding or bruising tendency. Immunologic: No malaise. No swollen glands. Musculoskeletal: No back pain. No joint pain. Integumentary: No rash. No abrasions. Neurologic: No headache. No focal numbness or weakness. PAST MEDICAL HISTORY     Past Medical History:   Diagnosis Date    Arthritis     ESRD (end stage renal disease) on dialysis ([facility]     Mon, Wed, Fri [address] [place] [address] Gregor Bland     Hemodialysis patient ([facility]     HTN (hypertension)     Neuropathy     Restless leg     Restless leg syndrome     Seizure ([facility]     last sz 2016         SURGICALHISTORY       Past Surgical History:   Procedure Laterality Date    AXILLARY-BRACHIAL BYPASS GRAFT Left     HERNIA REPAIR      PARATHYROIDECTOMY           CURRENT MEDICATIONS       Discharge Medication List as of 7/16/2021  4:47 AM      CONTINUE these medications which have NOT CHANGED    Details   gabapentin (NEURONTIN) 300 MG capsule Take 600 mg by mouth 3 times daily. Historical Med      clopidogrel (PLAVIX) 75 MG tablet Take 75 mg by mouth dailyHistorical Med      metoprolol tartrate (LOPRESSOR) 25 MG tablet Take 25 mg by mouth dailyHistorical Med      Multiple Vitamin (DAILY EZEKIEL PO) Take 1 tablet by mouth daily Historical Med      albuterol sulfate  (90 Base) MCG/ACT inhaler Inhale 2 puffs into the lungs every 6 hours as needed Historical Med      aspirin 81 MG chewable tablet Take 1 tablet by mouth daily, Disp-30 tablet, R-0Print      losartan (COZAAR) 100 MG tablet Take 100 mg by mouth dailyHistorical Med      isosorbide mononitrate (IMDUR) 30 MG extended release tablet Take 30 mg by mouth nightly Historical Med      atorvastatin (LIPITOR) 40 MG tablet Take 80 mg by mouth daily Historical Med      pantoprazole (PROTONIX) 40 MG tablet Take 40 mg by mouth dailyHistorical Med      Cyanocobalamin (VITAMIN B 12 PO) Take 1,000 mcg by mouth daily Historical Med      levetiracetam (KEPPRA) 500 MG tablet Take 500 mg by mouth 2 times daily Historical Med      duloxetine (CYMBALTA) 60 MG capsule Take 60 mg by mouth daily. Melatonin 3 MG TABS Take 3 mg by mouth nightly as needed Historical Med      carbidopa-levodopa (SINEMET)  MG per tablet Take 3 tablets by mouth 3 times daily.                ALLERGIES     Amlodipine besylate-valsartan, Losartan, Zolpidem, Diovan [valsartan], Lisinopril, Sexually Abused:        SCREENINGS             PHYSICAL EXAM    (up to 7 for level 4, 8 or more for level 5)     ED Triage Vitals [07/15/21 2215]   BP Temp Temp Source Pulse Resp SpO2 Height Weight   (!) 219/106 98.5 °F (36.9 °C) Oral 73 20 94 % 5' 8\" (1.727 m) 186 lb 15.2 oz (84.8 kg)       General: Alert and oriented appropriately for age, No acute distress. Eye: Normal conjunctiva. Pupils equal and reactive. HENT: Oral mucosa is moist.  Respiratory: Respirations even and non-labored. Lungs CTAB. L chest wall ttp. Cardiovascular: Normal rate, Regular rhythm. Intact peripheral pulses. No edema, no JVD. Gastrointestinal: Soft, Non-tender, Non-distended. Musculoskeletal: No swelling. Integumentary: Warm, Dry. Neurologic: Alert and appropriate for age. No focal deficits. Psychiatric: Cooperative. DIAGNOSTIC RESULTS     EKG: All EKG's are interpreted by the Emergency Department Physician who either signs or Co-signsthis chart in the absence of a cardiologist.    The Ekg interpreted by me shows  normal sinus rhythm with a rate of 75 bpm.  Frequent PVCs  Axis is   normal  QTc is  484 ms. Intervals and Durations are otherwise unremarkable. ST Segments: nonspecific TWA not significantly changed from prior EKG, remote anterior infarct  No significant change from prior EKG dated 12/26/2019. RADIOLOGY:   Non-plain filmimages such as CT, Ultrasound and MRI are read by the radiologist. Plain radiographic images are visualized and preliminarily interpreted by the emergency physician with the below findings:      Interpretation per the Radiologist below, if available at the time ofthis note:    CT CHEST PULMONARY EMBOLISM W CONTRAST   Final Result   No evidence of pulmonary embolism or acute pulmonary abnormality. Moderate cardiomegaly. XR CHEST (2 VW)   Final Result   Stable mid right lung mild scarring.       Otherwise, unremarkable radiographic views of the chest.               ED BEDSIDE tel. [phone],  Chemistry results called to and read back by Yaz San RN, 2021  01:07, by [facility]  Performed at:  [facility]  [address] [place], [place] [address]   Phone [phone]   BLOOD GAS, VENOUS    Narrative:     Performed at:  [facility]  [address] [place], [place] [address]   Phone [phone]   LACTIC ACID, PLASMA    Narrative:     Performed at:  [facility]  [address] [place], [place] [address]   Phone [phone]       All other labs were within normal range or not returned as of this dictation. EMERGENCY DEPARTMENT COURSE and DIFFERENTIAL DIAGNOSIS/MDM:   Vitals:    Vitals:    21 0255 21 0325 21 0340 21 0430   BP: (!) 208/69 (!) 213/93 (!) 189/107 (!) 178/81   Pulse: 74 70 73 69   Resp: 15 15 13 14   Temp:       TempSrc:       SpO2: 92% 96% 98% 96%   Weight:       Height:             Medical decision makin yo M with PMHx of ESRD on HD, NSTEMI, prior PE who p/w L sided chest wall pain, likely intercostal strain as described as pleuritic and is reproducible on exam and occurred after changing a tire and twisting his thorax. Is HDS but given his h/o PE and his age cannot PERC out. He has no O2 requirement and has no hallmarks of volume overload on exam.  Unlikely anginal in nature, labs as above including BMP, CBC, trop, dimer. CXR. CXR neg acute, shows chronic R lung scarring. No pulm edema, no pleural effusion or infection. Dimer +ve, CTA PE performed and negative acute, no pleural effusion, pericardial effusion, EKG without evidence of pericarditis or acute ischemia, shows stable waveform, presence of PVCs which patient has had in the past as well. There is no PE.  Likely intercostal muscle strain as initially thought, patient improved with multimodal pain control, does require repeat dosing., Remains normoxic, hemodynamically stable. Stable for and amenable to discharge home. Given anticipatory guidance, return precautions, voiced understanding. Discharged home, ambulates steadily from the emergency department upon discharge. Rx Flexeril and Tylenol for pain control. Medications   oxyCODONE-acetaminophen (PERCOCET) 5-325 MG per tablet 1 tablet (1 tablet Oral Given 7/15/21 9552)   iopamidol (ISOVUE-370) 76 % injection 75 mL (75 mLs Intravenous Given 7/16/21 6710)   HYDROmorphone (DILAUDID) injection 0.5 mg (0.5 mg Intravenous Given 7/16/21 3390)           FINAL IMPRESSION      1. Pleuritic chest pain    2.  HTN (hypertension), malignant          DISPOSITION/PLAN   DISPOSITION Decision To Discharge 07/16/2021 04:17:29 AM      PATIENT REFERRED TO:  [facility] Emergency Department  [address]  [phone]    If symptoms worsen    [facility]) Pre-Services  [phone]          DISCHARGE MEDICATIONS:  Discharge Medication List as of 7/16/2021  4:47 AM      START taking these medications    Details   cyclobenzaprine (FLEXERIL) 10 MG tablet Take 1 tablet by mouth 3 times daily as needed for Muscle spasms, Disp-21 tablet, R-0Print      acetaminophen (TYLENOL) 500 MG tablet Take 2 tablets by mouth every 6 hours as needed for Pain, Disp-180 tablet, R-0Print                (Please note that portions of this note were completed with a voice recognition program.Efforts were made to edit the dictations but occasionally words are mis-transcribed.)    Natalya Simmons MD (electronically signed)  Attending Emergency Physician          Natalya Simmons MD  07/18/21 7358

## 2021-07-16 NOTE — ED NOTES
D/C: Order noted for d/c. Pt confirmed d/c paperwork . Discharge and education instructions reviewed with patient. Teach-back successful. Pt verbalized understanding and signed d/c papers. Pt denied questions at this time. No acute distress noted. Patient instructed to follow-up as noted - return to emergency department if symptoms worsen. Patient verbalized understanding. Discharged per EDMD with discharge instructions. Pt discharged to private vehicle. Patient stable upon departure. Thanked patient for choosing [facility] for care. Provider aware of patient pain at time of discharge.      Ryan Arias RN  07/16/21 5082

## 2021-11-12 ENCOUNTER — APPOINTMENT (OUTPATIENT)
Dept: CT IMAGING | Age: 51
End: 2021-11-12
Payer: MEDICARE

## 2021-11-12 ENCOUNTER — HOSPITAL ENCOUNTER (EMERGENCY)
Age: 51
Discharge: HOME | End: 2021-11-12
Attending: EMERGENCY MEDICINE
Payer: MEDICARE

## 2021-11-12 VITALS
HEART RATE: 65 BPM | SYSTOLIC BLOOD PRESSURE: 189 MMHG | TEMPERATURE: 97 F | RESPIRATION RATE: 18 BRPM | DIASTOLIC BLOOD PRESSURE: 97 MMHG | OXYGEN SATURATION: 95 %

## 2021-11-12 DIAGNOSIS — R10.9 ABDOMINAL PAIN, UNSPECIFIED ABDOMINAL LOCATION: Primary | ICD-10-CM

## 2021-11-12 DIAGNOSIS — G89.29 OTHER CHRONIC PAIN: ICD-10-CM

## 2021-11-12 DIAGNOSIS — M79.7 FIBROMYALGIA: ICD-10-CM

## 2021-11-12 LAB
A/G RATIO: 1.4 (ref 1.1–2.2)
ALBUMIN SERPL-MCNC: 4.3 G/DL (ref 3.4–5)
ALP BLD-CCNC: 134 U/L (ref 40–129)
ALT SERPL-CCNC: 8 U/L (ref 10–40)
ANION GAP SERPL CALCULATED.3IONS-SCNC: 15 MMOL/L (ref 3–16)
AST SERPL-CCNC: 19 U/L (ref 15–37)
BASOPHILS ABSOLUTE: 0.1 K/UL (ref 0–0.2)
BASOPHILS RELATIVE PERCENT: 1.2 %
BILIRUB SERPL-MCNC: 0.3 MG/DL (ref 0–1)
BUN BLDV-MCNC: 28 MG/DL (ref 7–20)
CALCIUM SERPL-MCNC: 7.8 MG/DL (ref 8.3–10.6)
CHLORIDE BLD-SCNC: 96 MMOL/L (ref 99–110)
CO2: 26 MMOL/L (ref 21–32)
CREAT SERPL-MCNC: 9.6 MG/DL (ref 0.9–1.3)
EOSINOPHILS ABSOLUTE: 0.1 K/UL (ref 0–0.6)
EOSINOPHILS RELATIVE PERCENT: 2.3 %
GFR AFRICAN AMERICAN: 7
GFR NON-AFRICAN AMERICAN: 6
GLUCOSE BLD-MCNC: 92 MG/DL (ref 70–99)
HCT VFR BLD CALC: 36 % (ref 40.5–52.5)
HEMOGLOBIN: 12 G/DL (ref 13.5–17.5)
LIPASE: 97 U/L (ref 13–60)
LYMPHOCYTES ABSOLUTE: 0.9 K/UL (ref 1–5.1)
LYMPHOCYTES RELATIVE PERCENT: 16.5 %
MCH RBC QN AUTO: 32.7 PG (ref 26–34)
MCHC RBC AUTO-ENTMCNC: 33.3 G/DL (ref 31–36)
MCV RBC AUTO: 98.4 FL (ref 80–100)
MONOCYTES ABSOLUTE: 0.6 K/UL (ref 0–1.3)
MONOCYTES RELATIVE PERCENT: 10.8 %
NEUTROPHILS ABSOLUTE: 3.7 K/UL (ref 1.7–7.7)
NEUTROPHILS RELATIVE PERCENT: 69.2 %
PDW BLD-RTO: 19 % (ref 12.4–15.4)
PLATELET # BLD: 138 K/UL (ref 135–450)
PMV BLD AUTO: 10.5 FL (ref 5–10.5)
POTASSIUM REFLEX MAGNESIUM: 4 MMOL/L (ref 3.5–5.1)
RBC # BLD: 3.66 M/UL (ref 4.2–5.9)
SODIUM BLD-SCNC: 137 MMOL/L (ref 136–145)
TOTAL PROTEIN: 7.4 G/DL (ref 6.4–8.2)
TROPONIN: 0.06 NG/ML
TROPONIN: 0.06 NG/ML
WBC # BLD: 5.3 K/UL (ref 4–11)

## 2021-11-12 PROCEDURE — 80053 COMPREHEN METABOLIC PANEL: CPT

## 2021-11-12 PROCEDURE — 85025 COMPLETE CBC W/AUTO DIFF WBC: CPT

## 2021-11-12 PROCEDURE — 99283 EMERGENCY DEPT VISIT LOW MDM: CPT

## 2021-11-12 PROCEDURE — 93005 ELECTROCARDIOGRAM TRACING: CPT | Performed by: EMERGENCY MEDICINE

## 2021-11-12 PROCEDURE — 83690 ASSAY OF LIPASE: CPT

## 2021-11-12 PROCEDURE — 36415 COLL VENOUS BLD VENIPUNCTURE: CPT

## 2021-11-12 PROCEDURE — 84484 ASSAY OF TROPONIN QUANT: CPT

## 2021-11-12 PROCEDURE — 74176 CT ABD & PELVIS W/O CONTRAST: CPT

## 2021-11-12 ASSESSMENT — ENCOUNTER SYMPTOMS
NAUSEA: 0
SORE THROAT: 0
EYE REDNESS: 0
VOMITING: 0
BACK PAIN: 1
ABDOMINAL PAIN: 1
RHINORRHEA: 0
SHORTNESS OF BREATH: 0
ABDOMINAL DISTENTION: 1

## 2021-11-12 ASSESSMENT — PAIN DESCRIPTION - LOCATION: LOCATION: SHOULDER

## 2021-11-12 ASSESSMENT — PAIN DESCRIPTION - PAIN TYPE: TYPE: ACUTE PAIN

## 2021-11-12 ASSESSMENT — PAIN SCALES - GENERAL: PAINLEVEL_OUTOF10: 6

## 2021-11-12 NOTE — ED TRIAGE NOTES
Patient arrives from home to the ED with complaints of abdominal pain. States abdomen has been painful and swollen for the past two weeks and thought it would get better. Patient is a dialysis patient who has been urged by nurses at facility to be seen. Patient did miss appointment yesterday and has a makeup appointment scheduled for tomorrow.

## 2021-11-12 NOTE — ED NOTES
Bed: [facility]  Expected date:   Expected time:   Means of arrival:   Comments:  Ab dennys Austin RN  11/12/21 8247

## 2021-11-12 NOTE — ED PROVIDER NOTES
[facility]  eMERGENCY dEPARTMENT eNCOUnter      Pt Name: Usama Sorto  MRN: [id number]  [place] 1970  Date of evaluation: 11/12/2021  Provider: Jaimie Turcios MD    CHIEF COMPLAINT       Chief Complaint   Patient presents with    Abdominal Pain     Dialysis patient, swelling in abdomen         HISTORY OF PRESENT ILLNESS   (Location/Symptom, Timing/Onset,Context/Setting, Quality, Duration, Modifying Factors, Severity)  Note limiting factors. Usama Sorto is a 46 y.o. male who presents to the emergency department with complaint of abdominal distention. This patient has multiple medical problems include arthritis, end-stage renal disease, fibromyalgia, hypertension, neuropathy. He states that several months ago he had a shunt infection in the left upper extremity so he has a temporary dialysis catheter in his right upper anterior chest.  He states that he constantly has pain in all of his joints from his arthritis, fibromyalgia, neuropathy. He states that he has been having this abdominal discomfort that is generalized for the past 2 weeks. For last 2 days his abdomen has been swelling and now the abdomen is swollen so much it makes him feel like he cannot take a deep breath. No fever, no chills, no urinary symptoms. His last bowel movement was earlier today. HPI    NursingNotes were reviewed. REVIEW OF SYSTEMS    (2-9 systems for level 4, 10 or more for level 5)     Review of Systems   Constitutional: Negative for fever. HENT: Negative for rhinorrhea and sore throat. Eyes: Negative for redness. Respiratory: Negative for shortness of breath. Cardiovascular: Negative for chest pain. Gastrointestinal: Positive for abdominal distention and abdominal pain. Negative for nausea and vomiting. Genitourinary: Negative for flank pain. Patient states he has not made urine for over 2 years.    Musculoskeletal: Positive for arthralgias, back pain, myalgias and neck stiffness. Patient states his arthritis is neck discomfort, his chronic low back pain, his joint pain is all chronic and unchanged. Skin: Negative for rash. Neurological: Negative for headaches. Hematological: Negative for adenopathy. Psychiatric/Behavioral: Negative for confusion. Except as noted above the remainder of the review of systems was reviewed and negative. PAST MEDICAL HISTORY     Past Medical History:   Diagnosis Date    Arthritis     ESRD (end stage renal disease) on dialysis ([facility] [address] [facility]     Mon, Wed, Fri [address]     Hemodialysis patient ([facility]     HTN (hypertension)     Neuropathy     Restless leg     Restless leg syndrome     Seizure ([facility]     last sz 2016         SURGICALHISTORY       Past Surgical History:   Procedure Laterality Date    AXILLARY-BRACHIAL BYPASS GRAFT Left     HERNIA REPAIR      PARATHYROIDECTOMY           CURRENT MEDICATIONS       Discharge Medication List as of 11/12/2021  6:36 PM      CONTINUE these medications which have NOT CHANGED    Details   acetaminophen (TYLENOL) 500 MG tablet Take 2 tablets by mouth every 6 hours as needed for Pain, Disp-180 tablet, R-0Print      gabapentin (NEURONTIN) 300 MG capsule Take 600 mg by mouth 3 times daily. Historical Med      clopidogrel (PLAVIX) 75 MG tablet Take 75 mg by mouth dailyHistorical Med      metoprolol tartrate (LOPRESSOR) 25 MG tablet Take 25 mg by mouth dailyHistorical Med      Multiple Vitamin (DAILY EZEKIEL PO) Take 1 tablet by mouth daily Historical Med      albuterol sulfate  (90 Base) MCG/ACT inhaler Inhale 2 puffs into the lungs every 6 hours as needed Historical Med      aspirin 81 MG chewable tablet Take 1 tablet by mouth daily, Disp-30 tablet, R-0Print      losartan (COZAAR) 100 MG tablet Take 100 mg by mouth dailyHistorical Med      isosorbide mononitrate (IMDUR) 30 MG extended release tablet Take 30 mg by mouth nightly Historical Med      atorvastatin (LIPITOR) 40 MG tablet Take 80 mg by mouth daily Historical Med      pantoprazole (PROTONIX) 40 MG tablet Take 40 mg by mouth dailyHistorical Med      Cyanocobalamin (VITAMIN B 12 PO) Take 1,000 mcg by mouth daily Historical Med      levetiracetam (KEPPRA) 500 MG tablet Take 500 mg by mouth 2 times daily Historical Med      duloxetine (CYMBALTA) 60 MG capsule Take 60 mg by mouth daily. Melatonin 3 MG TABS Take 3 mg by mouth nightly as needed Historical Med      carbidopa-levodopa (SINEMET)  MG per tablet Take 3 tablets by mouth 3 times daily.                ALLERGIES     Amlodipine besylate-valsartan, Losartan, Zolpidem, Diovan [valsartan], Lisinopril, Lyrica [pregabalin], Morphine, Naproxen, Ondansetron hcl, and Povidone-iodine    FAMILY HISTORY       Family History   Problem Relation Age of Onset    Kidney Disease Mother     Hypertension Mother    Lin Lupus Mother           SOCIAL HISTORY       Social History     Socioeconomic History    Marital status: Single     Spouse name: None    Number of children: None    Years of education: None    Highest education level: None   Occupational History    None   Tobacco Use    Smoking status: Current Every Day Smoker     Packs/day: 1.00     Years: 34.00     Pack years: 34.00     Types: Cigarettes    Smokeless tobacco: Never Used   Vaping Use    Vaping Use: Every day   Substance and Sexual Activity    Alcohol use: No     Alcohol/week: 0.8 standard drinks     Types: 1 Standard drinks or equivalent per week     Comment: not currently    Drug use: No     Types: Marijuana (Weed)     Comment: stopped in 2yrs ago    Sexual activity: Yes     Partners: Female   Other Topics Concern    None   Social History Narrative    None     Social Determinants of Health     Financial Resource Strain:     Difficulty of Paying Living Expenses: Not on file   Food Insecurity:     Worried About Running Out of Food in the Last Year: Not on file    Ran Out of Food in the Last Year: Not on file   Transportation Needs:     Lack of Transportation (Medical): Not on file    Lack of Transportation (Non-Medical): Not on file   Physical Activity:     Days of Exercise per Week: Not on file    Minutes of Exercise per Session: Not on file   Stress:     Feeling of Stress : Not on file   Social Connections:     Frequency of Communication with Friends and Family: Not on file    Frequency of Social Gatherings with Friends and Family: Not on file    Attends Congregational Services: Not on file    Active Member of [address] [place] [address] [employer] or Organizations: Not on file    Attends Club or Organization Meetings: Not on file    Marital Status: Not on file   Intimate Partner Violence:     Fear of Current or Ex-Partner: Not on file    Emotionally Abused: Not on file    Physically Abused: Not on file    Sexually Abused: Not on file   Housing Stability:     Unable to Pay for Housing in the Last Year: Not on file    Number of [place] in the Last Year: Not on file    Unstable Housing in the Last Year: Not on file       SCREENINGS             PHYSICAL EXAM    (up to 7 for level 4, 8 or more for level 5)     ED Triage Vitals   BP Temp Temp Source Pulse Resp SpO2 Height Weight   11/12/21 1317 11/12/21 1314 11/12/21 1314 11/12/21 1314 11/12/21 1314 11/12/21 1314 -- --   (!) 153/88 97 °F (36.1 °C) Oral 64 16 95 %         Physical Exam  Vitals and nursing note reviewed. Constitutional:       Appearance: He is well-developed. He is not diaphoretic. HENT:      Head: Normocephalic and atraumatic. Mouth/Throat:      Mouth: Mucous membranes are moist.   Eyes:      General:         Right eye: No discharge. Left eye: No discharge. Conjunctiva/sclera: Conjunctivae normal.   Cardiovascular:      Rate and Rhythm: Normal rate. Pulses: Normal pulses. Heart sounds: No murmur heard.        Comments: Dialysis catheter in the right anterior superior chest. Dressing is clean dry and intact. Pulmonary:      Effort: Pulmonary effort is normal. No respiratory distress. Abdominal:      General: There is distension. Tenderness: There is abdominal tenderness. There is no guarding or rebound. Comments: Decreased bowel sounds. Musculoskeletal:         General: Normal range of motion. Cervical back: Neck supple. Skin:     General: Skin is warm and dry. Capillary Refill: Capillary refill takes less than 2 seconds. Neurological:      Mental Status: He is alert and oriented to person, place, and time. Psychiatric:         Behavior: Behavior normal.         DIAGNOSTIC RESULTS     EKG: All EKG's are interpreted by the Emergency Department Physician who either signs or Co-signsthis chart in the absence of a cardiologist.        RADIOLOGY:   Nolene Bears such as CT, Ultrasound and MRI are read by the radiologist. Plain radiographic images are visualized and preliminarily interpreted by the emergency physician with the below findings:        Interpretation per the Radiologist below, if available at the time ofthis note:    CT ABDOMEN PELVIS WO CONTRAST Additional Contrast? None   Final Result   1. No findings to account for abdominal distension. No acute abnormality   demonstrated   2. Polycystic kidneys with nonobstructing renal calculi   3. Cholelithiasis   4.  Colonic diverticulosis               ED BEDSIDE ULTRASOUND:   Performed by ED Physician - none    LABS:  Labs Reviewed   CBC WITH AUTO DIFFERENTIAL - Abnormal; Notable for the following components:       Result Value    RBC 3.66 (*)     Hemoglobin 12.0 (*)     Hematocrit 36.0 (*)     RDW 19.0 (*)     Lymphocytes Absolute 0.9 (*)     All other components within normal limits    Narrative:     Performed at:  [address] [address]   Phone [phone]   COMPREHENSIVE METABOLIC PANEL W/ REFLEX TO MG FOR LOW K - Abnormal; Notable for the following components:    Chloride 96 (*)     BUN 28 (*)     CREATININE 9.6 (*)     GFR Non- 6 (*)     GFR  7 (*)     Calcium 7.8 (*)     Alkaline Phosphatase 134 (*)     ALT 8 (*)     All other components within normal limits    Narrative:     Shira Bermeo tel. [phone],  Chemistry results called to and read back by Osvaldo Cuellar RN, 11/12/2021  17:18, by [facility]  Performed at:  [facility]  [address], [place] [facility] [address]   Phone [phone]   LIPASE - Abnormal; Notable for the following components:    Lipase 97.0 (*)     All other components within normal limits    Narrative:     Shira Bermeo tel. [phone],  Chemistry results called to and read back by Osvaldo Cuellar RN, 11/12/2021  17:18, by [facility]  Performed at:  [facility]  [address], [place] [facility] [address]   Phone [phone]   TROPONIN - Abnormal; Notable for the following components:    Troponin 0.06 (*)     All other components within normal limits    Narrative:     Performed at:  [facility]  [address], [place] [facility] [address]   Phone [phone]   TROPONIN - Abnormal; Notable for the following components:    Troponin 0.06 (*)     All other components within normal limits    Narrative:     Performed at:  [facility]  [address], [facility] [address]   Phone [phone]       All other labs were within normal range or not returned as of this dictation.     EMERGENCY DEPARTMENT COURSE and DIFFERENTIAL DIAGNOSIS/MDM:   Vitals:    Vitals:    11/12/21 1314 11/12/21 1317 11/12/21 1628 11/12/21 1838   BP:  (!) 153/88 (!) 158/88 (!) 189/97   Pulse: 64  65    Resp: 16  18    Temp: 97 °F (36.1 °C)      TempSrc: Oral      SpO2: 95%              MDM  Number of Diagnoses or Management Options  Abdominal pain, unspecified abdominal location  Fibromyalgia  Other chronic pain  Diagnosis management comments: Patient does appear to have a mildly distended abdomen the chronicity of this is unclear. He also complains of his chronic pain began to worsen despite being on gabapentin he is not improving. Patient's work-up in the emergency department does not reveal any acute pathology. While his lipase is mildly elevated there is no intra-abdominal pathology noted on his CAT scan. His anemia is chronic. He has end-stage renal disease for which she is been compliant with his dialysis and his potassium is unremarkable. Patient's troponin 0 0.06 and was repeated and remained 0.06 I do not think that this is atypical presentation of myocardial ischemia. He will be given the name of a chronic pain specialist that he may follow-up with. CRITICAL CARE TIME   Total Critical Care time was 0 minutes, excluding separately reportable procedures. There was a high probability of clinically significant/life threatening deterioration in the patient's condition which required my urgent intervention. CONSULTS:  None    PROCEDURES:  Unless otherwise noted below, none     Procedures    FINAL IMPRESSION      1. Abdominal pain, unspecified abdominal location    2. Other chronic pain    3. Fibromyalgia          DISPOSITION/PLAN   DISPOSITION Decision To Discharge 11/12/2021 06:23:22 PM      PATIENT REFERRED TO:  Leticia Wick MD  [address] [place] [address]  [phone]    Call in 1 week  To see if he is accepting chronic pain patients.       DISCHARGE MEDICATIONS:  Discharge Medication List as of 11/12/2021  6:36 PM             (Please note that portions of this note were completed with a voice recognition program.Efforts were made to edit the dictations but occasionally words are mis-transcribed.)    Cait Townsend MD (electronically signed)  Attending Emergency Physician          Cait Townsend, MD  11/12/21 5176

## 2021-11-13 LAB
EKG ATRIAL RATE: 62 BPM
EKG DIAGNOSIS: NORMAL
EKG P AXIS: 61 DEGREES
EKG P-R INTERVAL: 200 MS
EKG Q-T INTERVAL: 474 MS
EKG QRS DURATION: 106 MS
EKG QTC CALCULATION (BAZETT): 481 MS
EKG R AXIS: 25 DEGREES
EKG T AXIS: 50 DEGREES
EKG VENTRICULAR RATE: 62 BPM

## 2021-11-13 PROCEDURE — 93010 ELECTROCARDIOGRAM REPORT: CPT | Performed by: INTERNAL MEDICINE
